# Patient Record
Sex: MALE | Race: ASIAN | NOT HISPANIC OR LATINO | Employment: FULL TIME | URBAN - NONMETROPOLITAN AREA
[De-identification: names, ages, dates, MRNs, and addresses within clinical notes are randomized per-mention and may not be internally consistent; named-entity substitution may affect disease eponyms.]

---

## 2022-12-16 ENCOUNTER — OFFICE VISIT (OUTPATIENT)
Dept: INTERNAL MEDICINE | Facility: CLINIC | Age: 37
End: 2022-12-16

## 2022-12-16 VITALS
RESPIRATION RATE: 16 BRPM | DIASTOLIC BLOOD PRESSURE: 80 MMHG | BODY MASS INDEX: 33.46 KG/M2 | HEIGHT: 72 IN | TEMPERATURE: 97.3 F | HEART RATE: 82 BPM | SYSTOLIC BLOOD PRESSURE: 136 MMHG | WEIGHT: 247 LBS | OXYGEN SATURATION: 99 %

## 2022-12-16 DIAGNOSIS — E66.09 CLASS 1 OBESITY DUE TO EXCESS CALORIES WITHOUT SERIOUS COMORBIDITY WITH BODY MASS INDEX (BMI) OF 33.0 TO 33.9 IN ADULT: ICD-10-CM

## 2022-12-16 DIAGNOSIS — Z00.00 ENCOUNTER FOR PREVENTIVE HEALTH EXAMINATION: Primary | ICD-10-CM

## 2022-12-16 DIAGNOSIS — J68.3 REACTIVE AIRWAYS DYSFUNCTION SYNDROME: ICD-10-CM

## 2022-12-16 DIAGNOSIS — J30.2 SEASONAL ALLERGIES: ICD-10-CM

## 2022-12-16 PROCEDURE — 99385 PREV VISIT NEW AGE 18-39: CPT | Performed by: INTERNAL MEDICINE

## 2022-12-16 RX ORDER — MONTELUKAST SODIUM 10 MG/1
10 TABLET ORAL NIGHTLY
Qty: 30 TABLET | Refills: 2 | Status: SHIPPED | OUTPATIENT
Start: 2022-12-16 | End: 2023-03-14

## 2022-12-16 RX ORDER — ALBUTEROL SULFATE 90 UG/1
2 AEROSOL, METERED RESPIRATORY (INHALATION) EVERY 4 HOURS PRN
Qty: 8 G | Refills: 2 | Status: SHIPPED | OUTPATIENT
Start: 2022-12-16

## 2022-12-16 NOTE — PATIENT INSTRUCTIONS
Health Maintenance, Male  Adopting a healthy lifestyle and getting preventive care are important in promoting health and wellness. Ask your health care provider about:  The right schedule for you to have regular tests and exams.  Things you can do on your own to prevent diseases and keep yourself healthy.  What should I know about diet, weight, and exercise?  Eat a healthy diet    Eat a diet that includes plenty of vegetables, fruits, low-fat dairy products, and lean protein.  Do not eat a lot of foods that are high in solid fats, added sugars, or sodium.  Maintain a healthy weight  Body mass index (BMI) is a measurement that can be used to identify possible weight problems. It estimates body fat based on height and weight. Your health care provider can help determine your BMI and help you achieve or maintain a healthy weight.  Get regular exercise  Get regular exercise. This is one of the most important things you can do for your health. Most adults should:  Exercise for at least 150 minutes each week. The exercise should increase your heart rate and make you sweat (moderate-intensity exercise).  Do strengthening exercises at least twice a week. This is in addition to the moderate-intensity exercise.  Spend less time sitting. Even light physical activity can be beneficial.  Watch cholesterol and blood lipids  Have your blood tested for lipids and cholesterol at 20 years of age, then have this test every 5 years.  You may need to have your cholesterol levels checked more often if:  Your lipid or cholesterol levels are high.  You are older than 40 years of age.  You are at high risk for heart disease.  What should I know about cancer screening?  Many types of cancers can be detected early and may often be prevented. Depending on your health history and family history, you may need to have cancer screening at various ages. This may include screening for:  Colorectal cancer.  Prostate cancer.  Skin cancer.  Lung  cancer.  What should I know about heart disease, diabetes, and high blood pressure?  Blood pressure and heart disease  High blood pressure causes heart disease and increases the risk of stroke. This is more likely to develop in people who have high blood pressure readings or are overweight.  Talk with your health care provider about your target blood pressure readings.  Have your blood pressure checked:  Every 3-5 years if you are 18-39 years of age.  Every year if you are 40 years old or older.  If you are between the ages of 65 and 75 and are a current or former smoker, ask your health care provider if you should have a one-time screening for abdominal aortic aneurysm (AAA).  Diabetes  Have regular diabetes screenings. This checks your fasting blood sugar level. Have the screening done:  Once every three years after age 45 if you are at a normal weight and have a low risk for diabetes.  More often and at a younger age if you are overweight or have a high risk for diabetes.  What should I know about preventing infection?  Hepatitis B  If you have a higher risk for hepatitis B, you should be screened for this virus. Talk with your health care provider to find out if you are at risk for hepatitis B infection.  Hepatitis C  Blood testing is recommended for:  Everyone born from 1945 through 1965.  Anyone with known risk factors for hepatitis C.  Sexually transmitted infections (STIs)  You should be screened each year for STIs, including gonorrhea and chlamydia, if:  You are sexually active and are younger than 24 years of age.  You are older than 24 years of age and your health care provider tells you that you are at risk for this type of infection.  Your sexual activity has changed since you were last screened, and you are at increased risk for chlamydia or gonorrhea. Ask your health care provider if you are at risk.  Ask your health care provider about whether you are at high risk for HIV. Your health care provider  may recommend a prescription medicine to help prevent HIV infection. If you choose to take medicine to prevent HIV, you should first get tested for HIV. You should then be tested every 3 months for as long as you are taking the medicine.  Follow these instructions at home:  Alcohol use  Do not drink alcohol if your health care provider tells you not to drink.  If you drink alcohol:  Limit how much you have to 0-2 drinks a day.  Know how much alcohol is in your drink. In the [place] one drink equals one 12 oz bottle of beer (355 mL), one 5 oz glass of wine (148 mL), or one 1½ oz glass of hard liquor (44 mL).  Lifestyle  Do not use any products that contain nicotine or tobacco. These products include cigarettes, chewing tobacco, and vaping devices, such as e-cigarettes. If you need help quitting, ask your health care provider.  Do not use street drugs.  Do not share needles.  Ask your health care provider for help if you need support or information about quitting drugs.  General instructions  Schedule regular health, dental, and eye exams.  Stay current with your vaccines.  Tell your health care provider if:  You often feel depressed.  You have ever been abused or do not feel safe at home.  Summary  Adopting a healthy lifestyle and getting preventive care are important in promoting health and wellness.  Follow your health care provider's instructions about healthy diet, exercising, and getting tested or screened for diseases.  Follow your health care provider's instructions on monitoring your cholesterol and blood pressure.  This information is not intended to replace advice given to you by your health care provider. Make sure you discuss any questions you have with your health care provider.  Document Revised: 05/09/2022 Document Reviewed: 05/09/2022  Elsevier Patient Education © 2022 [employer]

## 2022-12-16 NOTE — PROGRESS NOTES
Chief Complaint   Patient presents with   • Annual Exam   • Establish Care     In the [employer], may be in [place] a year       Subjective     History of Present Illness   Dg Morse is a 37 y.o. male presenting for annual physical.  Preventive health maintenance was reviewed and discussed today. Vaccines were updated. Family history of hypertension.  Has been dealing with more sinus issues since moving to [place].  Has tried OTC medications such as zyrtec.  Has a slight wheeze occasionally when he takes a deep breath.  Has some soreness and headaches between the eyes and irritation in the ears.  Denies smoking.  Father was recently diagnosed with Parkinson's. Originally from [place].     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  Walks and climbs steps at times.  Exercise routine has not been as regular since moving to [place] and managing family life with kids.  Usually cooks at home.     The following portions of the patient's history were reviewed and updated as appropriate: allergies, current medications, past family history, past medical history, past social history, past surgical history and problem list.    Review of Systems   Constitutional: Negative for chills, fatigue and fever.   HENT: Negative for congestion, ear pain, rhinorrhea, sinus pressure and sore throat.    Eyes: Negative for visual disturbance.   Respiratory: Negative for cough, chest tightness, shortness of breath and wheezing.    Cardiovascular: Negative for chest pain, palpitations and leg swelling.   Gastrointestinal: Negative for abdominal pain, blood in stool, constipation, diarrhea, nausea and vomiting.   Endocrine: Negative for polydipsia and polyuria.   Genitourinary: Negative for dysuria and hematuria.   Musculoskeletal: Negative for arthralgias and back pain.   Skin: Negative for rash.   Neurological: Negative for dizziness, light-headedness, numbness and headaches.   Psychiatric/Behavioral: Negative for dysphoric mood and sleep  "disturbance. The patient is not nervous/anxious.        No Known Allergies    History reviewed. No pertinent past medical history.    Social History     Socioeconomic History   • Marital status:    Tobacco Use   • Smoking status: Never   Substance and Sexual Activity   • Alcohol use: Never   • Drug use: Never   • Sexual activity: Yes     Partners: Female     Birth control/protection: Surgical        History reviewed. No pertinent surgical history.    Family History   Problem Relation Age of Onset   • Hypertension Mother    • Parkinsonism Father          Current Outpatient Medications:   •  albuterol sulfate  (90 Base) MCG/ACT inhaler, Inhale 2 puffs Every 4 (Four) Hours As Needed for Wheezing., Disp: 8 g, Rfl: 2  •  montelukast (SINGULAIR) 10 MG tablet, Take 1 tablet by mouth Every Night., Disp: 30 tablet, Rfl: 2    Objective   /80   Pulse 82   Temp 97.3 °F (36.3 °C)   Resp 16   Ht 182.9 cm (72\")   Wt 112 kg (247 lb)   SpO2 99%   BMI 33.50 kg/m²     Physical Exam  Vitals and nursing note reviewed.   Constitutional:       Appearance: Normal appearance. He is well-developed. He is obese.   HENT:      Head: Normocephalic and atraumatic.      Right Ear: Tympanic membrane and external ear normal.      Left Ear: Tympanic membrane and external ear normal.      Nose: Nose normal. No congestion.      Mouth/Throat:      Mouth: Mucous membranes are moist.      Pharynx: No oropharyngeal exudate.   Eyes:      General: No scleral icterus.        Right eye: No discharge.      Pupils: Pupils are equal, round, and reactive to light.   Neck:      Thyroid: No thyromegaly.      Vascular: No JVD.   Cardiovascular:      Rate and Rhythm: Normal rate and regular rhythm.      Heart sounds: Normal heart sounds. No murmur heard.    No friction rub.   Pulmonary:      Effort: Pulmonary effort is normal. No respiratory distress.      Breath sounds: Normal breath sounds. No stridor. No wheezing.   Abdominal:      " General: Bowel sounds are normal. There is no distension.      Palpations: Abdomen is soft.      Tenderness: There is no abdominal tenderness. There is no guarding.   Genitourinary:     Comments: Deferred  Musculoskeletal:         General: No tenderness. Normal range of motion.      Cervical back: Normal range of motion and neck supple.   Lymphadenopathy:      Cervical: No cervical adenopathy.   Skin:     General: Skin is warm and dry.      Findings: No rash.   Neurological:      Mental Status: He is alert and oriented to person, place, and time.      Cranial Nerves: No cranial nerve deficit.   Psychiatric:         Mood and Affect: Mood normal.         Behavior: Behavior normal.         Thought Content: Thought content normal.         Assessment & Plan   Diagnoses and all orders for this visit:    1. Encounter for preventive health examination (Primary)  -     CBC & Differential  -     Comprehensive Metabolic Panel  -     Lipid Panel  -     Hepatitis Panel, Acute  -     Hemoglobin A1c    2. Class 1 obesity due to excess calories without serious comorbidity with body mass index (BMI) of 33.0 to 33.9 in adult  -     Hemoglobin A1c    3. Seasonal allergies  -     montelukast (SINGULAIR) 10 MG tablet; Take 1 tablet by mouth Every Night.  Dispense: 30 tablet; Refill: 2  -     albuterol sulfate  (90 Base) MCG/ACT inhaler; Inhale 2 puffs Every 4 (Four) Hours As Needed for Wheezing.  Dispense: 8 g; Refill: 2    4. Reactive airways dysfunction syndrome (HCC)  -     albuterol sulfate  (90 Base) MCG/ACT inhaler; Inhale 2 puffs Every 4 (Four) Hours As Needed for Wheezing.  Dispense: 8 g; Refill: 2          Discussion Summary:  Patient is a 37 y.o. male presenting for annual physical    1. Preventive Health Maintenance  - Baseline labs are up-to-date or ordered per above.  - Vaccines reviewed and updated  - Preventive health measures were discussed including: healthy diet with increase in fruits and vegetables,  regular exercise at least 3 times a week, safe sex practices, avoidance of drugs, tobacco, and alcohol, and regular seatbelt use.    2. Seasonal Allergies with mild reactive airways  - seems to be worse since moving to [place]  - ok to try Singulair and albuterol inhaler PRN.   - should symptoms persist, consider PFTs and allergy consult.       3.  Obesity  - Weight loss options were discussed in detail including reducing fried, fatty, and sugary foods with diet control. A regular exercise regimen was discussed.  BMI is >= 30 and <35. (Class 1 Obesity). The following options were offered after discussion;: exercise counseling/recommendations and nutrition counseling/recommendations        Follow up:  Return in about 1 year (around 12/16/2023) for Annual physical.     Patient Instructions:  Patient instructions were provided.

## 2022-12-17 ENCOUNTER — LAB (OUTPATIENT)
Dept: LAB | Facility: HOSPITAL | Age: 37
End: 2022-12-17

## 2022-12-17 LAB
ALBUMIN SERPL-MCNC: 4.5 G/DL (ref 3.5–5.2)
ALBUMIN/GLOB SERPL: 1.7 G/DL
ALP SERPL-CCNC: 35 U/L (ref 39–117)
ALT SERPL W P-5'-P-CCNC: 49 U/L (ref 1–41)
ANION GAP SERPL CALCULATED.3IONS-SCNC: 8 MMOL/L (ref 5–15)
AST SERPL-CCNC: 26 U/L (ref 1–40)
BASOPHILS # BLD AUTO: 0.05 10*3/MM3 (ref 0–0.2)
BASOPHILS NFR BLD AUTO: 0.7 % (ref 0–1.5)
BILIRUB SERPL-MCNC: 0.6 MG/DL (ref 0–1.2)
BUN SERPL-MCNC: 11 MG/DL (ref 6–20)
BUN/CREAT SERPL: 10.9 (ref 7–25)
CALCIUM SPEC-SCNC: 9 MG/DL (ref 8.6–10.5)
CHLORIDE SERPL-SCNC: 101 MMOL/L (ref 98–107)
CHOLEST SERPL-MCNC: 276 MG/DL (ref 0–200)
CO2 SERPL-SCNC: 31 MMOL/L (ref 22–29)
CREAT SERPL-MCNC: 1.01 MG/DL (ref 0.76–1.27)
DEPRECATED RDW RBC AUTO: 40.9 FL (ref 37–54)
EGFRCR SERPLBLD CKD-EPI 2021: 98.2 ML/MIN/1.73
EOSINOPHIL # BLD AUTO: 0.26 10*3/MM3 (ref 0–0.4)
EOSINOPHIL NFR BLD AUTO: 3.5 % (ref 0.3–6.2)
ERYTHROCYTE [DISTWIDTH] IN BLOOD BY AUTOMATED COUNT: 12.5 % (ref 12.3–15.4)
GLOBULIN UR ELPH-MCNC: 2.7 GM/DL
GLUCOSE SERPL-MCNC: 113 MG/DL (ref 65–99)
HAV IGM SERPL QL IA: NORMAL
HBA1C MFR BLD: 6.2 % (ref 4.8–5.6)
HBV CORE IGM SERPL QL IA: NORMAL
HBV SURFACE AG SERPL QL IA: NORMAL
HCT VFR BLD AUTO: 51.1 % (ref 37.5–51)
HCV AB SER DONR QL: NORMAL
HDLC SERPL-MCNC: 33 MG/DL (ref 40–60)
HGB BLD-MCNC: 17.2 G/DL (ref 13–17.7)
IMM GRANULOCYTES # BLD AUTO: 0.04 10*3/MM3 (ref 0–0.05)
IMM GRANULOCYTES NFR BLD AUTO: 0.5 % (ref 0–0.5)
LDLC SERPL CALC-MCNC: 170 MG/DL (ref 0–100)
LDLC/HDLC SERPL: 5.09 {RATIO}
LYMPHOCYTES # BLD AUTO: 3.55 10*3/MM3 (ref 0.7–3.1)
LYMPHOCYTES NFR BLD AUTO: 47.7 % (ref 19.6–45.3)
MCH RBC QN AUTO: 30 PG (ref 26.6–33)
MCHC RBC AUTO-ENTMCNC: 33.7 G/DL (ref 31.5–35.7)
MCV RBC AUTO: 89.2 FL (ref 79–97)
MONOCYTES # BLD AUTO: 0.56 10*3/MM3 (ref 0.1–0.9)
MONOCYTES NFR BLD AUTO: 7.5 % (ref 5–12)
NEUTROPHILS NFR BLD AUTO: 2.99 10*3/MM3 (ref 1.7–7)
NEUTROPHILS NFR BLD AUTO: 40.1 % (ref 42.7–76)
NRBC BLD AUTO-RTO: 0 /100 WBC (ref 0–0.2)
PLATELET # BLD AUTO: 134 10*3/MM3 (ref 140–450)
PMV BLD AUTO: 10.3 FL (ref 6–12)
POTASSIUM SERPL-SCNC: 4 MMOL/L (ref 3.5–5.2)
PROT SERPL-MCNC: 7.2 G/DL (ref 6–8.5)
RBC # BLD AUTO: 5.73 10*6/MM3 (ref 4.14–5.8)
SODIUM SERPL-SCNC: 140 MMOL/L (ref 136–145)
TRIGL SERPL-MCNC: 375 MG/DL (ref 0–150)
VLDLC SERPL-MCNC: 73 MG/DL (ref 5–40)
WBC NRBC COR # BLD: 7.45 10*3/MM3 (ref 3.4–10.8)

## 2022-12-17 PROCEDURE — 36415 COLL VENOUS BLD VENIPUNCTURE: CPT | Performed by: INTERNAL MEDICINE

## 2022-12-17 PROCEDURE — 85025 COMPLETE CBC W/AUTO DIFF WBC: CPT | Performed by: INTERNAL MEDICINE

## 2022-12-17 PROCEDURE — 80061 LIPID PANEL: CPT | Performed by: INTERNAL MEDICINE

## 2022-12-17 PROCEDURE — 80053 COMPREHEN METABOLIC PANEL: CPT | Performed by: INTERNAL MEDICINE

## 2022-12-17 PROCEDURE — 80074 ACUTE HEPATITIS PANEL: CPT | Performed by: INTERNAL MEDICINE

## 2022-12-17 PROCEDURE — 83036 HEMOGLOBIN GLYCOSYLATED A1C: CPT | Performed by: INTERNAL MEDICINE

## 2022-12-17 NOTE — PROGRESS NOTES
I reviewed the patient's labs.  His cholesterol and A1C are high.  A1C indicates borderline diabetes.  I suspect poor diet causing these very high levels.  It is also starting to show mild elevation in liver tests.      Rather than starting medication, I would like for the patient to work on diet aggressively over the next 3 months. We can have follow up and repeat some labs.

## 2023-03-13 DIAGNOSIS — J30.2 SEASONAL ALLERGIES: ICD-10-CM

## 2023-03-14 RX ORDER — MONTELUKAST SODIUM 10 MG/1
10 TABLET ORAL NIGHTLY
Qty: 90 TABLET | Refills: 1 | Status: SHIPPED | OUTPATIENT
Start: 2023-03-14

## 2023-10-23 ENCOUNTER — TELEPHONE (OUTPATIENT)
Dept: INTERNAL MEDICINE | Facility: CLINIC | Age: 38
End: 2023-10-23

## 2023-10-23 NOTE — TELEPHONE ENCOUNTER
Caller: Dg Morse    Relationship: Self    Best call back number: [phone]     What is the medical concern/diagnosis: ALLERGIES    What specialty or service is being requested: ALLERGIST    What is the provider, practice or medical service name: THE DOCTOR HE WAS PREVIOUSLY SENT TO    What is the office location: [place]    Any additional details: PATIENT STATES THAT HIS ALLERGIES HAVE STARTED ACTING UP AGAIN.

## 2023-10-27 ENCOUNTER — OFFICE VISIT (OUTPATIENT)
Dept: INTERNAL MEDICINE | Facility: CLINIC | Age: 38
End: 2023-10-27
Payer: COMMERCIAL

## 2023-10-27 VITALS
TEMPERATURE: 97.5 F | HEIGHT: 72 IN | OXYGEN SATURATION: 97 % | BODY MASS INDEX: 34.19 KG/M2 | HEART RATE: 98 BPM | SYSTOLIC BLOOD PRESSURE: 144 MMHG | WEIGHT: 252.4 LBS | DIASTOLIC BLOOD PRESSURE: 110 MMHG

## 2023-10-27 DIAGNOSIS — J30.2 SEASONAL ALLERGIES: ICD-10-CM

## 2023-10-27 DIAGNOSIS — Z23 NEED FOR INFLUENZA VACCINATION: Primary | ICD-10-CM

## 2023-10-27 RX ORDER — MONTELUKAST SODIUM 10 MG/1
10 TABLET ORAL NIGHTLY
Qty: 90 TABLET | Refills: 1 | Status: SHIPPED | OUTPATIENT
Start: 2023-10-27

## 2023-10-27 NOTE — PROGRESS NOTES
Subjective   Dg Morse is a 38 y.o. male.     History of Present Illness  Patient presents with complaints of seasonal allergies.  He has been using over-the-counter allergy medicines as well as Flonase and a Harmony pot.  States that this is his second year leaving him taking the following last year he had the same symptoms and was started on Singulair.  He states his symptoms got better in the spring so he stopped taking the Singulair.  He has been out of it for a while.  States he is having a lot of nasal congestion and sneezing and ear popping.      The following portions of the patient's history were reviewed and updated as appropriate: allergies, current medications, past family history, past medical history, past social history, past surgical history, and problem list.    Review of Systems   HENT:  Negative for congestion and sore throat.    Respiratory:  Negative for cough.    Neurological:  Negative for headaches.   All other systems reviewed and are negative.      Objective   Physical Exam  Vitals and nursing note reviewed.   Constitutional:       Appearance: Normal appearance. He is normal weight.   HENT:      Head: Normocephalic and atraumatic.      Right Ear: External ear normal.      Left Ear: External ear normal.      Nose: Nose normal.      Mouth/Throat:      Mouth: Mucous membranes are moist.      Pharynx: Oropharynx is clear.   Eyes:      Extraocular Movements: Extraocular movements intact.      Conjunctiva/sclera: Conjunctivae normal.      Pupils: Pupils are equal, round, and reactive to light.   Cardiovascular:      Rate and Rhythm: Normal rate and regular rhythm.      Pulses: Normal pulses.      Heart sounds: Normal heart sounds. No murmur heard.     No gallop.   Pulmonary:      Effort: Pulmonary effort is normal. No respiratory distress.      Breath sounds: Normal breath sounds. No wheezing, rhonchi or rales.   Abdominal:      General: Abdomen is flat. Bowel sounds are normal.       Palpations: Abdomen is soft.   Musculoskeletal:         General: Normal range of motion.      Cervical back: Normal range of motion and neck supple. No rigidity or tenderness.   Lymphadenopathy:      Cervical: No cervical adenopathy.   Skin:     General: Skin is warm.      Capillary Refill: Capillary refill takes less than 2 seconds.      Coloration: Skin is not jaundiced or pale.      Findings: No bruising, erythema, lesion or rash.   Neurological:      General: No focal deficit present.      Mental Status: He is alert and oriented to person, place, and time. Mental status is at baseline.   Psychiatric:         Mood and Affect: Mood normal.         Behavior: Behavior normal.         Thought Content: Thought content normal.         Judgment: Judgment normal.       Assessment & Plan   Diagnoses and all orders for this visit:    1. Need for influenza vaccination (Primary)  -     Fluzone >6 Months ([id number])    2. Seasonal allergies  -     montelukast (SINGULAIR) 10 MG tablet; Take 1 tablet by mouth Every Night.  Dispense: 90 tablet; Refill: 1    Start Singulair on top of over-the-counter's and Flonase to help with seasonal allergies  Flu shot given today  Patient's blood pressure was elevated when he got here but he states that he had a lot of caffeine and food right before he came upstairs, pt left without having bp rechecked

## 2024-03-05 ENCOUNTER — OFFICE VISIT (OUTPATIENT)
Dept: INTERNAL MEDICINE | Facility: CLINIC | Age: 39
End: 2024-03-05
Payer: COMMERCIAL

## 2024-03-05 VITALS
DIASTOLIC BLOOD PRESSURE: 93 MMHG | BODY MASS INDEX: 34.51 KG/M2 | HEIGHT: 72 IN | OXYGEN SATURATION: 98 % | SYSTOLIC BLOOD PRESSURE: 133 MMHG | TEMPERATURE: 97.8 F | WEIGHT: 254.8 LBS | HEART RATE: 68 BPM

## 2024-03-05 DIAGNOSIS — R42 DIZZINESS: ICD-10-CM

## 2024-03-05 DIAGNOSIS — I10 ESSENTIAL HYPERTENSION: ICD-10-CM

## 2024-03-05 DIAGNOSIS — I10 ESSENTIAL HYPERTENSION: Primary | ICD-10-CM

## 2024-03-05 PROBLEM — E78.2 MIXED DYSLIPIDEMIA: Status: ACTIVE | Noted: 2024-03-05

## 2024-03-05 LAB
EXPIRATION DATE: NORMAL
HBA1C MFR BLD: 5.6 % (ref 4.5–5.7)
Lab: NORMAL

## 2024-03-05 RX ORDER — HYDROCHLOROTHIAZIDE 12.5 MG/1
12.5 TABLET ORAL DAILY
Qty: 30 TABLET | Refills: 1 | Status: SHIPPED | OUTPATIENT
Start: 2024-03-05

## 2024-03-05 NOTE — PROGRESS NOTES
Office Visit      Patient Name: Dg Morse  : 1985   MRN: [id number]     Chief Complaint:    Chief Complaint   Patient presents with    Hypertension     /95 at home.     Dizziness     Symptoms 1 week.        History of Present Illness: Dg Morse is a 38 y.o. male who is here today to discuss blood pressure and dizziness.    He reports experiencing dizziness for around 1-1.5 week.  Blood pressure readings at home was 120-125/95. Today he was eating a piece of candy and felt like he was going to pass out, but no LOC. Dizziness comes and goes. Currently has a slight headache but no dizziness. He has recently had a lot of headaches across his forehead bilaterally. Denies ear pain. No chest pain, palpitations, SOA or swelling in legs. He has been stressed recently. No recent illness. No supplement use with exception of a multivitamin. Non-smoker. Prediabetic and high cholesterol on last labs. Has not been exercising consistently for the last 8 months.  He has attempted to improve his diet over the last week but has not noticed any improvement in symptoms yet.  He does have a strong family history of hypertension.    He had labs drawn for pre-deployment physical but he does not have labs back yet. He will provide a copy once available.     Subjective      I have reviewed and the following portions of the patient's history were updated as appropriate: past family history, past medical history, past social history, past surgical history and problem list.      Current Outpatient Medications:     albuterol sulfate  (90 Base) MCG/ACT inhaler, Inhale 2 puffs Every 4 (Four) Hours As Needed for Wheezing. (Patient taking differently: Inhale 2 puffs As Needed for Wheezing.), Disp: 8 g, Rfl: 2    montelukast (SINGULAIR) 10 MG tablet, Take 1 tablet by mouth Every Night., Disp: 90 tablet, Rfl: 1    hydroCHLOROthiazide 12.5 MG tablet, Take 1 tablet by mouth Daily., Disp: 30 tablet, Rfl: 1    No Known  "Allergies    Objective     Physical Exam:  Vital Signs:   Vitals:    03/05/24 1731   BP: 133/93   Pulse: 68   Temp: 97.8 °F (36.6 °C)   SpO2: 98%   Weight: 116 kg (254 lb 12.8 oz)   Height: 182.9 cm (72.01\")     Body mass index is 34.55 kg/m².    Physical Exam  Vitals and nursing note reviewed.   Constitutional:       General: He is not in acute distress.     Appearance: He is well-developed. He is obese. He is not ill-appearing, toxic-appearing or diaphoretic.   HENT:      Head: Normocephalic and atraumatic.      Right Ear: Tympanic membrane, ear canal and external ear normal. There is no impacted cerumen.      Left Ear: Tympanic membrane, ear canal and external ear normal. There is no impacted cerumen.      Mouth/Throat:      Mouth: Mucous membranes are moist.      Pharynx: No posterior oropharyngeal erythema.   Eyes:      General: No scleral icterus.     Extraocular Movements: Extraocular movements intact.      Conjunctiva/sclera: Conjunctivae normal.      Pupils: Pupils are equal, round, and reactive to light.   Cardiovascular:      Rate and Rhythm: Normal rate and regular rhythm.      Heart sounds: Normal heart sounds. No murmur heard.     No friction rub. No gallop.   Pulmonary:      Effort: Pulmonary effort is normal. No respiratory distress.      Breath sounds: Normal breath sounds. No wheezing, rhonchi or rales.   Chest:      Chest wall: No tenderness.   Musculoskeletal:         General: No tenderness or deformity. Normal range of motion.      Cervical back: Normal range of motion and neck supple. No rigidity or tenderness.      Right lower leg: No edema.      Left lower leg: No edema.   Lymphadenopathy:      Cervical: No cervical adenopathy.   Skin:     General: Skin is warm and dry.      Capillary Refill: Capillary refill takes less than 2 seconds.      Coloration: Skin is not jaundiced or pale.      Findings: No erythema or rash.   Neurological:      Mental Status: He is alert and oriented to person, " place, and time.      Cranial Nerves: No cranial nerve deficit.      Sensory: No sensory deficit.      Motor: No weakness or abnormal muscle tone.      Coordination: Coordination normal.      Gait: Gait normal.      Deep Tendon Reflexes: Reflexes normal.   Psychiatric:         Mood and Affect: Mood normal.         Behavior: Behavior normal.         Thought Content: Thought content normal.         Judgment: Judgment normal.         Common labs          3/5/2024    18:02   Common Labs   Hemoglobin A1C 5.6          Assessment / Plan      Assessment/Plan:   Diagnoses and all orders for this visit:    1. Essential hypertension (Primary)  -     hydroCHLOROthiazide 12.5 MG tablet; Take 1 tablet by mouth Daily.  Dispense: 30 tablet; Refill: 1    2. Dizziness  -     POC Glycosylated Hemoglobin (Hb A1C)       A1c 5.6 today.  Hypertension is likely the cause of dizziness.  Begin HCTZ 12.5 mg daily and recheck in 2 weeks or sooner if symptoms worsen.  Recommended low-sodium diet and increased physical activity.    Requested he provide a copy of labs from outside source for review.        Follow Up:   Return in about 2 weeks (around 3/19/2024) for Annual physical.    Patient was given instructions and counseling regarding his condition or for health maintenance advice. Please see specific information pulled into the AVS if appropriate.     Elena Serrato PA-C  [facility]     Please note that portions of this note may have been completed with a voice recognition program. Efforts were made to edit the dictations, but occasionally words are mistranscribed.

## 2024-03-06 ENCOUNTER — TELEPHONE (OUTPATIENT)
Dept: INTERNAL MEDICINE | Facility: CLINIC | Age: 39
End: 2024-03-06
Payer: COMMERCIAL

## 2024-03-06 RX ORDER — HYDROCHLOROTHIAZIDE 12.5 MG/1
12.5 TABLET ORAL DAILY
Qty: 90 TABLET | OUTPATIENT
Start: 2024-03-06

## 2024-03-06 NOTE — TELEPHONE ENCOUNTER
Caller: Dg Morse    Relationship to patient: Self    Best call back number:[phone]     PATIENT IS NEEDING CPT CODES FOR THE FOLLOWING INJECTIONS: TYPHOID - ANTHRAX BOOSTER       PLEASE CALL

## 2024-03-07 NOTE — TELEPHONE ENCOUNTER
Patient said that he is trying to get vaccines, but [facility] them right now and the health dept cannot provide. He said he is [facility] and let us know if he needs any further assistance.

## 2024-03-11 ENCOUNTER — TELEPHONE (OUTPATIENT)
Dept: INTERNAL MEDICINE | Facility: CLINIC | Age: 39
End: 2024-03-11
Payer: COMMERCIAL

## 2024-03-11 NOTE — TELEPHONE ENCOUNTER
Caller: Dg Morse    Relationship to patient: Self    Best call back number: [phone]         PATIENT IS WANTING TO UPDATE HIS VACCINATION RECORD TO SHOW THAT HE REC'D HIS T-DAP SHOT ON 03/06/24 AT THE [facility]. HIS PLACE OF EMPLOYMENT.    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

## 2024-03-19 ENCOUNTER — OFFICE VISIT (OUTPATIENT)
Dept: INTERNAL MEDICINE | Facility: CLINIC | Age: 39
End: 2024-03-19
Payer: COMMERCIAL

## 2024-03-19 VITALS
BODY MASS INDEX: 33.46 KG/M2 | SYSTOLIC BLOOD PRESSURE: 130 MMHG | DIASTOLIC BLOOD PRESSURE: 86 MMHG | HEART RATE: 57 BPM | HEIGHT: 72 IN | TEMPERATURE: 97 F | WEIGHT: 247 LBS | OXYGEN SATURATION: 96 %

## 2024-03-19 DIAGNOSIS — Z00.00 ANNUAL PHYSICAL EXAM: Primary | ICD-10-CM

## 2024-03-19 DIAGNOSIS — I10 ESSENTIAL HYPERTENSION: ICD-10-CM

## 2024-03-19 RX ORDER — HYDROCHLOROTHIAZIDE 25 MG/1
25 TABLET ORAL DAILY
Qty: 90 TABLET | Refills: 2 | Status: SHIPPED | OUTPATIENT
Start: 2024-03-19

## 2024-03-19 NOTE — PROGRESS NOTES
Male Physical Note      Patient Name: Dg Morse  : 1985   MRN: [id number]     Chief Complaint:    Chief Complaint   Patient presents with    Annual Exam    Hypertension       History of Present Illness: Dg Morse is a 38 y.o. male who is here today for their annual health maintenance and physical.     He was seen here 2 weeks ago with complaint of dizziness, increased headaches and elevated blood pressure.  He was started on hydrochlorothiazide 12.5 mg daily which he is taking as directed. Today he reports he is feeling much better now. He has lost 7 pounds through diet and exercise. Dizziness and headaches have resolved. BP has been running 118-125/85-90.     He had labs drawn for pre-deployment physical which we reviewed today. Hemoglobin, hematocrit and RBC elevated. LDL and triglycerides elevated.       Subjective         Past Medical History, Social History, Family History and Care Team were all reviewed with patient and updated as appropriate.       Current Outpatient Medications:     albuterol sulfate  (90 Base) MCG/ACT inhaler, Inhale 2 puffs Every 4 (Four) Hours As Needed for Wheezing. (Patient taking differently: Inhale 2 puffs As Needed for Wheezing.), Disp: 8 g, Rfl: 2    hydroCHLOROthiazide 25 MG tablet, Take 1 tablet by mouth Daily., Disp: 90 tablet, Rfl: 2    montelukast (SINGULAIR) 10 MG tablet, Take 1 tablet by mouth Every Night., Disp: 90 tablet, Rfl: 1    No Known Allergies      Diet/Physical activity: recent diet improvement and exercising more        The ASCVD Risk score (Nico PARISI, et al., 2019) failed to calculate for the following reasons:    The 2019 ASCVD risk score is only valid for ages 40 to 79    Health Maintenance:   Health Maintenance   Topic Date Due    COVID-19 Vaccine ( season) 2024 (Originally 2023)    ANNUAL PHYSICAL  2025    BMI FOLLOWUP  2025    TDAP/TD VACCINES (2 - Td or Tdap) 2034    HEPATITIS C SCREENING   "Completed    INFLUENZA VACCINE  Completed    Pneumococcal Vaccine 0-64  Aged Out            Objective     Physical Exam:  Vital Signs:   Vitals:    03/19/24 1618   BP: 130/86   Pulse: 57   Temp: 97 °F (36.1 °C)   TempSrc: Infrared   SpO2: 96%   Weight: 112 kg (247 lb)   Height: 182.9 cm (72.01\")   PainSc: 0-No pain     Body mass index is 33.49 kg/m².   BMI is >= 30 and <35. (Class 1 Obesity). The following options were offered after discussion;: exercise counseling/recommendations and nutrition counseling/recommendations      Physical Exam  Vitals and nursing note reviewed.   Constitutional:       General: He is not in acute distress.     Appearance: He is well-developed. He is obese. He is not ill-appearing, toxic-appearing or diaphoretic.   HENT:      Head: Normocephalic and atraumatic.      Right Ear: Tympanic membrane, ear canal and external ear normal. There is no impacted cerumen.      Left Ear: Tympanic membrane, ear canal and external ear normal. There is no impacted cerumen.      Nose: Nose normal.      Mouth/Throat:      Mouth: Mucous membranes are moist.      Pharynx: No oropharyngeal exudate or posterior oropharyngeal erythema.   Eyes:      General: No scleral icterus.     Conjunctiva/sclera: Conjunctivae normal.      Pupils: Pupils are equal, round, and reactive to light.   Neck:      Thyroid: No thyromegaly.   Cardiovascular:      Rate and Rhythm: Normal rate and regular rhythm.      Heart sounds: Normal heart sounds. No murmur heard.     No friction rub. No gallop.   Pulmonary:      Effort: Pulmonary effort is normal. No respiratory distress.      Breath sounds: Normal breath sounds. No stridor. No wheezing, rhonchi or rales.   Chest:      Chest wall: No tenderness.   Abdominal:      General: Bowel sounds are normal. There is no distension.      Palpations: Abdomen is soft. There is no mass.      Tenderness: There is no abdominal tenderness. There is no guarding or rebound.      Hernia: No hernia is " present.   Musculoskeletal:         General: No tenderness. Normal range of motion.      Cervical back: Normal range of motion and neck supple.      Right lower leg: No edema.      Left lower leg: No edema.   Lymphadenopathy:      Cervical: No cervical adenopathy.   Skin:     General: Skin is warm and dry.      Capillary Refill: Capillary refill takes less than 2 seconds.      Coloration: Skin is not pale.      Findings: No rash.   Neurological:      Mental Status: He is alert and oriented to person, place, and time.      Cranial Nerves: No cranial nerve deficit.      Sensory: No sensory deficit.      Gait: Gait normal.      Deep Tendon Reflexes: Reflexes normal.   Psychiatric:         Mood and Affect: Mood normal.         Behavior: Behavior normal.         Thought Content: Thought content normal.         Judgment: Judgment normal.         Procedures    Assessment / Plan      Assessment/Plan:   Diagnoses and all orders for this visit:    1. Annual physical exam (Primary)    2. Essential hypertension  -     hydroCHLOROthiazide 25 MG tablet; Take 1 tablet by mouth Daily.  Dispense: 90 tablet; Refill: 2    Increase HCTZ to 25 mg daily.     Recheck HTN and HLD after upcoming 6-month deployment.       Follow Up:   Return follow up after upcoming 6-month deployment.    Healthcare Maintenance:   Patient Counseling:  --Nutrition: Stressed importance of moderation in sodium/caffeine intake, saturated fat and cholesterol, caloric balance, sufficient intake of fresh fruits, vegetables, fiber, calcium and iron.  --Discussed the issue of calcium supplement, and the daily use of baby aspirin if applicable.  --Exercise: Stressed the importance of regular exercise.   --Substance Abuse: Discussed cessation/primary prevention of tobacco (if applicable, alcohol, or other drug use (if applicable); driving or other dangerous activities under the influence; availability of treatment for abuse.    --Sexuality: Discussed sexually  transmitted diseases, partner selection, use of condoms, avoidance of unintended pregnancy  and contraceptive alternatives.   --Injury prevention: Discussed safety belts, safety helmets, smoke detector, smoking near bedding or upholstery.   --Dental health: Discussed importance of regular tooth brushing, flossing, and dental visits.  --Immunizations reviewed.  --Discussed benefits of screening colonoscopy (if applicable).  --After hours service discussed with patient.    Elena Serrato PA-C  [facility] [address] [place]     Please note that portions of this note may have been completed with a voice recognition program.

## 2024-04-01 DIAGNOSIS — I10 ESSENTIAL HYPERTENSION: ICD-10-CM

## 2024-04-01 RX ORDER — HYDROCHLOROTHIAZIDE 12.5 MG/1
12.5 TABLET ORAL DAILY
Qty: 90 TABLET | OUTPATIENT
Start: 2024-04-01

## 2024-04-29 DIAGNOSIS — I10 ESSENTIAL HYPERTENSION: ICD-10-CM

## 2024-04-29 RX ORDER — HYDROCHLOROTHIAZIDE 12.5 MG/1
12.5 TABLET ORAL DAILY
Qty: 90 TABLET | OUTPATIENT
Start: 2024-04-29

## 2024-12-02 ENCOUNTER — OFFICE VISIT (OUTPATIENT)
Dept: PSYCHIATRY | Facility: CLINIC | Age: 39
End: 2024-12-02
Payer: COMMERCIAL

## 2024-12-02 DIAGNOSIS — F43.23 SITUATIONAL MIXED ANXIETY AND DEPRESSIVE DISORDER: Primary | ICD-10-CM

## 2024-12-02 PROCEDURE — 90791 PSYCH DIAGNOSTIC EVALUATION: CPT | Performed by: CASE MANAGER/CARE COORDINATOR
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
## 2024-12-04 ENCOUNTER — HOSPITAL ENCOUNTER (EMERGENCY)
Facility: HOSPITAL | Age: 39
Discharge: PSYCHIATRIC OR SUBSTANCE USE FACILITY | DRG: 882 | End: 2024-12-04
Attending: EMERGENCY MEDICINE | Admitting: EMERGENCY MEDICINE
Payer: COMMERCIAL

## 2024-12-04 ENCOUNTER — HOSPITAL ENCOUNTER (INPATIENT)
Facility: HOSPITAL | Age: 39
LOS: 7 days | Discharge: HOME | DRG: 882 | End: 2024-12-11
Attending: SPECIALIST | Admitting: SPECIALIST
Payer: COMMERCIAL

## 2024-12-04 VITALS
RESPIRATION RATE: 12 BRPM | DIASTOLIC BLOOD PRESSURE: 100 MMHG | SYSTOLIC BLOOD PRESSURE: 152 MMHG | HEIGHT: 72 IN | BODY MASS INDEX: 34.57 KG/M2 | OXYGEN SATURATION: 96 % | TEMPERATURE: 97.5 F | WEIGHT: 255.2 LBS | HEART RATE: 80 BPM

## 2024-12-04 DIAGNOSIS — R45.851 SUICIDAL IDEATION: Primary | ICD-10-CM

## 2024-12-04 PROBLEM — F33.1 MDD (MAJOR DEPRESSIVE DISORDER), RECURRENT EPISODE, MODERATE: Status: ACTIVE | Noted: 2024-12-04

## 2024-12-04 LAB
ALBUMIN SERPL-MCNC: 4.6 G/DL (ref 3.5–5.2)
ALBUMIN/GLOB SERPL: 1.3 G/DL
ALP SERPL-CCNC: 34 U/L (ref 39–117)
ALT SERPL W P-5'-P-CCNC: 57 U/L (ref 1–41)
AMPHET+METHAMPHET UR QL: NEGATIVE
AMPHETAMINES UR QL: NEGATIVE
ANION GAP SERPL CALCULATED.3IONS-SCNC: 13 MMOL/L (ref 5–15)
APAP SERPL-MCNC: <5 MCG/ML (ref 0–30)
AST SERPL-CCNC: 38 U/L (ref 1–40)
BARBITURATES UR QL SCN: NEGATIVE
BASOPHILS # BLD AUTO: 0.05 10*3/MM3 (ref 0–0.2)
BASOPHILS NFR BLD AUTO: 0.4 % (ref 0–1.5)
BENZODIAZ UR QL SCN: NEGATIVE
BILIRUB SERPL-MCNC: 1.2 MG/DL (ref 0–1.2)
BILIRUB UR QL STRIP: NEGATIVE
BUN SERPL-MCNC: 9 MG/DL (ref 6–20)
BUN/CREAT SERPL: 8.4 (ref 7–25)
BUPRENORPHINE SERPL-MCNC: NEGATIVE NG/ML
CALCIUM SPEC-SCNC: 9.4 MG/DL (ref 8.6–10.5)
CANNABINOIDS SERPL QL: NEGATIVE
CHLORIDE SERPL-SCNC: 97 MMOL/L (ref 98–107)
CLARITY UR: CLEAR
CO2 SERPL-SCNC: 25 MMOL/L (ref 22–29)
COCAINE UR QL: NEGATIVE
COLOR UR: YELLOW
CREAT SERPL-MCNC: 1.07 MG/DL (ref 0.76–1.27)
DEPRECATED RDW RBC AUTO: 37.5 FL (ref 37–54)
EGFRCR SERPLBLD CKD-EPI 2021: 90.5 ML/MIN/1.73
EOSINOPHIL # BLD AUTO: 0.02 10*3/MM3 (ref 0–0.4)
EOSINOPHIL NFR BLD AUTO: 0.2 % (ref 0.3–6.2)
ERYTHROCYTE [DISTWIDTH] IN BLOOD BY AUTOMATED COUNT: 12.2 % (ref 12.3–15.4)
ETHANOL BLD-MCNC: <10 MG/DL (ref 0–10)
ETHANOL UR QL: <0.01 %
FENTANYL UR-MCNC: NEGATIVE NG/ML
GLOBULIN UR ELPH-MCNC: 3.6 GM/DL
GLUCOSE SERPL-MCNC: 118 MG/DL (ref 65–99)
GLUCOSE UR STRIP-MCNC: NEGATIVE MG/DL
HCT VFR BLD AUTO: 49.1 % (ref 37.5–51)
HGB BLD-MCNC: 17.4 G/DL (ref 13–17.7)
HGB UR QL STRIP.AUTO: NEGATIVE
HOLD SPECIMEN: NORMAL
HOLD SPECIMEN: NORMAL
IMM GRANULOCYTES # BLD AUTO: 0.06 10*3/MM3 (ref 0–0.05)
IMM GRANULOCYTES NFR BLD AUTO: 0.5 % (ref 0–0.5)
KETONES UR QL STRIP: ABNORMAL
LEUKOCYTE ESTERASE UR QL STRIP.AUTO: NEGATIVE
LYMPHOCYTES # BLD AUTO: 2.79 10*3/MM3 (ref 0.7–3.1)
LYMPHOCYTES NFR BLD AUTO: 24.2 % (ref 19.6–45.3)
MAGNESIUM SERPL-MCNC: 2.3 MG/DL (ref 1.6–2.6)
MCH RBC QN AUTO: 30.3 PG (ref 26.6–33)
MCHC RBC AUTO-ENTMCNC: 35.4 G/DL (ref 31.5–35.7)
MCV RBC AUTO: 85.4 FL (ref 79–97)
METHADONE UR QL SCN: NEGATIVE
MONOCYTES # BLD AUTO: 1.15 10*3/MM3 (ref 0.1–0.9)
MONOCYTES NFR BLD AUTO: 10 % (ref 5–12)
NEUTROPHILS NFR BLD AUTO: 64.7 % (ref 42.7–76)
NEUTROPHILS NFR BLD AUTO: 7.47 10*3/MM3 (ref 1.7–7)
NITRITE UR QL STRIP: NEGATIVE
NRBC BLD AUTO-RTO: 0 /100 WBC (ref 0–0.2)
OPIATES UR QL: NEGATIVE
OXYCODONE UR QL SCN: NEGATIVE
PCP UR QL SCN: NEGATIVE
PH UR STRIP.AUTO: 6.5 [PH] (ref 5–8)
PLATELET # BLD AUTO: 263 10*3/MM3 (ref 140–450)
PMV BLD AUTO: 9.7 FL (ref 6–12)
POTASSIUM SERPL-SCNC: 3.7 MMOL/L (ref 3.5–5.2)
PROT SERPL-MCNC: 8.2 G/DL (ref 6–8.5)
PROT UR QL STRIP: NEGATIVE
RBC # BLD AUTO: 5.75 10*6/MM3 (ref 4.14–5.8)
SALICYLATES SERPL-MCNC: <0.3 MG/DL
SODIUM SERPL-SCNC: 135 MMOL/L (ref 136–145)
SP GR UR STRIP: 1.01 (ref 1–1.03)
TRICYCLICS UR QL SCN: NEGATIVE
TSH SERPL DL<=0.05 MIU/L-ACNC: 4.53 UIU/ML (ref 0.27–4.2)
UROBILINOGEN UR QL STRIP: ABNORMAL
WBC NRBC COR # BLD AUTO: 11.54 10*3/MM3 (ref 3.4–10.8)
WHOLE BLOOD HOLD COAG: NORMAL
WHOLE BLOOD HOLD SPECIMEN: NORMAL

## 2024-12-04 PROCEDURE — 99223 1ST HOSP IP/OBS HIGH 75: CPT | Performed by: PSYCHIATRY & NEUROLOGY

## 2024-12-04 PROCEDURE — 99285 EMERGENCY DEPT VISIT HI MDM: CPT

## 2024-12-04 PROCEDURE — 36415 COLL VENOUS BLD VENIPUNCTURE: CPT

## 2024-12-04 PROCEDURE — 99285 EMERGENCY DEPT VISIT HI MDM: CPT | Performed by: EMERGENCY MEDICINE

## 2024-12-04 PROCEDURE — 80143 DRUG ASSAY ACETAMINOPHEN: CPT | Performed by: EMERGENCY MEDICINE

## 2024-12-04 PROCEDURE — 80307 DRUG TEST PRSMV CHEM ANLYZR: CPT | Performed by: EMERGENCY MEDICINE

## 2024-12-04 PROCEDURE — 83735 ASSAY OF MAGNESIUM: CPT | Performed by: EMERGENCY MEDICINE

## 2024-12-04 PROCEDURE — 93005 ELECTROCARDIOGRAM TRACING: CPT | Performed by: EMERGENCY MEDICINE

## 2024-12-04 PROCEDURE — 82077 ASSAY SPEC XCP UR&BREATH IA: CPT | Performed by: EMERGENCY MEDICINE

## 2024-12-04 PROCEDURE — 80050 GENERAL HEALTH PANEL: CPT | Performed by: EMERGENCY MEDICINE

## 2024-12-04 PROCEDURE — 84439 ASSAY OF FREE THYROXINE: CPT | Performed by: NURSE PRACTITIONER

## 2024-12-04 PROCEDURE — 80179 DRUG ASSAY SALICYLATE: CPT | Performed by: EMERGENCY MEDICINE

## 2024-12-04 PROCEDURE — 81003 URINALYSIS AUTO W/O SCOPE: CPT | Performed by: EMERGENCY MEDICINE

## 2024-12-04 RX ORDER — HYDROCHLOROTHIAZIDE 12.5 MG/1
25 TABLET ORAL DAILY
Status: DISCONTINUED | OUTPATIENT
Start: 2024-12-04 | End: 2024-12-05

## 2024-12-04 RX ORDER — LOPERAMIDE HYDROCHLORIDE 2 MG/1
2 CAPSULE ORAL
Status: DISCONTINUED | OUTPATIENT
Start: 2024-12-04 | End: 2024-12-11 | Stop reason: HOSPADM

## 2024-12-04 RX ORDER — ACETAMINOPHEN 325 MG/1
650 TABLET ORAL EVERY 4 HOURS PRN
Status: DISCONTINUED | OUTPATIENT
Start: 2024-12-04 | End: 2024-12-11 | Stop reason: HOSPADM

## 2024-12-04 RX ORDER — POLYETHYLENE GLYCOL 3350 17 G/17G
17 POWDER, FOR SOLUTION ORAL DAILY PRN
Status: DISCONTINUED | OUTPATIENT
Start: 2024-12-04 | End: 2024-12-11 | Stop reason: HOSPADM

## 2024-12-04 RX ORDER — HYDROXYZINE HYDROCHLORIDE 25 MG/1
50 TABLET, FILM COATED ORAL EVERY 6 HOURS PRN
Status: DISCONTINUED | OUTPATIENT
Start: 2024-12-04 | End: 2024-12-08

## 2024-12-04 RX ORDER — ALUMINA, MAGNESIA, AND SIMETHICONE 2400; 2400; 240 MG/30ML; MG/30ML; MG/30ML
15 SUSPENSION ORAL EVERY 6 HOURS PRN
Status: DISCONTINUED | OUTPATIENT
Start: 2024-12-04 | End: 2024-12-11 | Stop reason: HOSPADM

## 2024-12-04 RX ORDER — TRAZODONE HYDROCHLORIDE 50 MG/1
50 TABLET, FILM COATED ORAL NIGHTLY PRN
Status: DISCONTINUED | OUTPATIENT
Start: 2024-12-04 | End: 2024-12-05

## 2024-12-04 RX ADMIN — FLUOXETINE HYDROCHLORIDE 20 MG: 20 CAPSULE ORAL at 11:25

## 2024-12-04 RX ADMIN — Medication 10 MG: at 20:27

## 2024-12-04 RX ADMIN — HYDROXYZINE HYDROCHLORIDE 50 MG: 25 TABLET ORAL at 05:00

## 2024-12-04 RX ADMIN — HYDROXYZINE HYDROCHLORIDE 50 MG: 25 TABLET ORAL at 20:26

## 2024-12-04 RX ADMIN — TRAZODONE HYDROCHLORIDE 50 MG: 50 TABLET ORAL at 20:27

## 2024-12-04 RX ADMIN — HYDROCHLOROTHIAZIDE 25 MG: 12.5 CAPSULE ORAL at 17:00
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
## 2024-12-04 NOTE — PROGRESS NOTES
"Dietitian Assessment    Patient Name: Dg Morse  YOB: 1985  MRN: [id number]  Admission date: 12/4/2024    Comment:        Clinical Nutrition Assessment      Reason for Assessment MST=2   H&P  Past Medical History:   Diagnosis Date    Depression     Hypertension     Seasonal allergies        History reviewed. No pertinent surgical history.         Current Problems        Encounter Information        Trending Narrative     12/4: Pt is in [facility]. EMR shows 7# (2.7%) within 9 months. Wt loss is not significant to malnutrition time frame criteria at this time. Noted for poor appetite. RD will f/up once PO intake is established and determine need for ONS.      Anthropometrics        Current Height, Weight Height: 183 cm (72.05\")  Weight: 112 kg (247 lb) (12/04/24 0445)   Trending Weight Hx     This admission:              PTA:     Wt Readings from Last 30 Encounters:   12/04/24 0445 112 kg (247 lb)   12/04/24 0159 116 kg (255 lb 3.2 oz)   03/19/24 1618 112 kg (247 lb)   03/05/24 1731 116 kg (254 lb 12.8 oz)   10/27/23 1522 114 kg (252 lb 6.4 oz)   12/16/22 1041 112 kg (247 lb)      BMI kg/m2 Body mass index is 33.46 kg/m².     Labs        Pertinent Labs     Results from last 7 days   Lab Units 12/04/24  0219   SODIUM mmol/L 135*   POTASSIUM mmol/L 3.7   CHLORIDE mmol/L 97*   CO2 mmol/L 25.0   BUN mg/dL 9   CREATININE mg/dL 1.07   CALCIUM mg/dL 9.4   BILIRUBIN mg/dL 1.2   ALK PHOS U/L 34*   ALT (SGPT) U/L 57*   AST (SGOT) U/L 38   GLUCOSE mg/dL 118*       Results from last 7 days   Lab Units 12/04/24  0219   MAGNESIUM mg/dL 2.3   HEMOGLOBIN g/dL 17.4   HEMATOCRIT % 49.1       Lab Results   Component Value Date    HGBA1C 5.6 03/05/2024            Medications       Scheduled Medications      Infusions       PRN Medications   acetaminophen    aluminum-magnesium hydroxide-simethicone    hydrOXYzine    loperamide    magnesium hydroxide    polyethylene glycol    traZODone     Physical Findings        Trending " Physical   Appearance, NFPE    --  Edema  None noted     Bowel Function LBM: PTA     Tubes N/a     Chewing/Swallowing WNL     Skin WNL       Estimated/Assessed Needs       Energy Requirements    EST Needs, Method, Wt used 9622-6816 kcal (MSJ 1.2-1.3)       Protein Requirements    EST Needs, Method, Wt used  g pro (.8-1 g pro/kg CBW)       Fluid Requirements     Estimated Needs (mL/day) 2581-7357 mL        Current Nutrition Orders & Evaluation of Intake       Oral Nutrition     Food Allergies NKFA   Current PO Diet Diet: Regular/House; Safe Tray; Fluid Consistency: Thin (IDDSI 0)   Supplement    PO Evaluation     Trending % PO Intake 12/4: None recorded at this time     Enteral Nutrition    Enteral Route    Order, Modulars, Flushes    Residual/Tolerance    TF Observation         Parenteral Nutrition     TPN Route    Total # Days on TPN    TPN Order, Lipid Details    MVI & Trace Element Freq    TPN Observation       Nutrition Diagnosis         Nutrition Dx Problem 1 No nutrition dx      Nutrition Dx Problem 2        Intervention Goal         Intervention Goal(s) Maintain CBW  PO intake meet >50% of estimated needs  Liberalized diet     Nutrition Intervention        RD Action Honor food preferences     Nutrition Prescription          Diet Prescription Regular   Supplement Prescription      Enteral Prescription        TPN Prescription      Monitor/Evaluation        Monitor Per protocol, I&O, PO intake, Pertinent labs, Weight, Skin status, GI status, Symptoms, POC/GOC, Swallow function     RD to f/up    Electronically signed by:  Gema Reid RD  12/04/24 08:50 EST
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
## 2024-12-04 NOTE — CONSULTS
Dg Morse  1985    Preferred Pronouns: He/Him  Race/Ethnicity:   Martial Status:   Guardian Name/Contact/etc: Self  Pt Lives With:  Wife and children ages 12 and 17  Occupation: Retired [employer]  Appearance:  Patient dressed appropriately     Time Called for Assessment: 02:58  Assessment Start and End: 02:58-03:12      DATA:   Clinician received a call from [facility] staff for a behavioral health consult.  The patient is agreeable to speak with the behavioral health team.  Met with patient at bedside. Patient is under 1:1 security monitoring.  The attending treatment team is Luis Calix RN and Dr. Valdez, Provider.  Patient presents today with chief compliant of suicidal ideation.   Clinician completed assessment with patient and observations are documented as follows.    ASSESSMENT:    Clinician consulted with patient for mental status exam and assessment.  Clinical descriptors are documented as follows.  Clinician completed CSSRS with patient for suicide risk assessment.  The results of patient’s CSSRS documented as follows.    Presenting Problems: Patient reported that he had been having an affair on his wife and the affair was exposed and posted on social media. Patient reported that he has feelings of shame and guilt due to the affair. Patient reported he has not slept but only 4 hours since Saturday. Patient reported decrease in appetite. Patient is tearful and withdrawn. Patient endorsed a death wish and tonight while his wife was asleep he took out his 9mm  and held it to feel the weight of it and plan to use it. Patient reported that the weapon was loaded. Patient denies holding it to his temple. Triage notes reported that he held gun to his temple. Patient reported that he did not shoot himself due to thinking of his children, his wife and his recently  grandmother.     Current Stressors: Relationship stress     Established Therapy, Medication Management or Other  Mental Health Services: Patient reported that he initiated seeking help by coming to [facility] on 12/2 and was seen by therapist. Patient reports that he has weekly therapy appointments started with next appointment on 12/11/2024  Current Psychiatric Medications:   Patient is not on any psychiatric medications at this time      Mental Status Exam:  Behavior: Appropriate  Psychomotor Movement: Appropriate  Attention and Cooperation: Normal and Cooperative  Mood: defeated, depressed and Affect: Appropriate  Orientation: alert and oriented to person, place, and time   Thought Process: linear, logical, and goal directed  Thought Content: normal  Delusions:  None presented  Hallucinations: None and Not demonstrated today   Concentration: Normal  Suicidal Ideation: Present, With plan, and With intent  Homicidal Ideation: Absent  Hopelessness: Severe  Speech: Normal  Eye Contact: Closed  Insight: Good  Judgement: Poor    Sleep: patient reports only having approximately 4 hours sleep since Saturday   Appetite: loss of appetite       Hx of Psychiatric or Detox Hospitalizations:  No  Most recent inpatient admission: N/A    Suicidal Ideation Assessment:    COLUMBIA-SUICIDE SEVERITY RATING SCALE  Psychiatric Inpatient Setting - Discharge Screener    Ask questions that are bold and underlined Discharge   Ask Questions 1 and 2 YES NO   Wish to be Dead:   Person endorses thoughts about a wish to be dead or not alive anymore, or wish to fall asleep and not wake up.  While you were here in the hospital, have you wished you were dead or wished you could go to sleep and not wake up? X    Suicidal Thoughts:   General non-specific thoughts of wanting to end one's life/die by suicide, “I've thought about killing myself” without general thoughts of ways to kill oneself/associated methods, intent, or plan.   While you were here in the hospital, have you actually had thoughts about killing yourself?  X    If YES to 2, ask questions 3, 4, 5, and  6.  If NO to 2, go directly to question 6   3) Suicidal Thoughts with Method (without Specific Plan or Intent to Act):   Person endorses thoughts of suicide and has thought of a least one method during the assessment period. This is different than a specific plan with time, place or method details worked out. “I thought about taking an overdose but I never made a specific plan as to when where or how I would actually do it….and I would never go through with it.”   Have you been thinking about how you might kill yourself?  X    4) Suicidal Intent (without Specific Plan):   Active suicidal thoughts of killing oneself and patient reports having some intent to act on such thoughts, as opposed to “I have the thoughts but I definitely will not do anything about them.”   Have you had these thoughts and had some intention of acting on them or do you have some intention of acting on them after you leave the hospital?  X    5) Suicide Intent with Specific Plan:   Thoughts of killing oneself with details of plan fully or partially worked out and person has some intent to carry it out.   Have you started to work out or worked out the details of how to kill yourself either for while you were here in the hospital or for after you leave the hospital? Do you intend to carry out this plan?   X     6) Suicide Behavior    While you were here in the hospital, have you done anything, started to do anything, or prepared to do anything to end your life?    Examples: Took pills, cut yourself, tried to hang yourself, took out pills but didn't swallow any because you changed your mind or someone took them from you, collected pills, secured a means of obtaining a gun, gave away valuables, wrote a will or suicide note, etc.  X     Suicidal: Present, With plan, and With intent  Patient endorsed a death wish and tonight while his wife was asleep he took out his 9mm  and held it to feel the weight of it and plan to use it. Patient reported that  the weapon was loaded. Patient denies holding it to his temple. Triage notes reported that he held gun to his temple. Patient reported that he did not shoot himself due to thinking of his children, his wife and his recently  grandmother.(If present, describe frequency of thoughts, patient's perception of impulse control, plan or behavior details, etc)  Previous Attempts: None reported   Most Recent Attempt: Tonight      Substance Use History  Patient denies any abuse. UDS negative for substances, EtOH WNL    PLAN:  At this time, clinician recommends inpatient treatment based upon the above assessment.   Clinician collaborated with the treatment team who agree to adopt the recommendations.  Clinician discussed recommendations with patient and/or patient support systems, and patient is agreeable to the plan.  Patient is agreeable for referrals to be sent to facilities and agencies for treatment.    Have the levels of care been discussed with the patient? Yes  Level of care recommendation: inpatient  Is patient agreeable to treatment? Yes      Care Coordination Timeline:  03:26 Patient was presented to Dr. Deleon treatment team. Patient was accepted for admission  03:31 Therapist updated patient on admission. Patient was tearful and willing to go to unit  03:42 ED Treatment team was updated on admission and therapist facilitated RN to RN reporting.    SIGNATURE  Jeremías Da Silva [facility]  2024

## 2024-12-04 NOTE — PLAN OF CARE
Goal Outcome Evaluation:  Plan of Care Reviewed With: patient  Patient Agreement with Plan of Care: agrees        Outcome Evaluation: Patient denies A/VH,SI,HI, no negative behaviors this shift. Will continue to monitor.

## 2024-12-04 NOTE — H&P
INITIAL PSYCHIATRIC HISTORY & PHYSICAL    Patient Identification:  Name:  Dg Morse  Age:  39 y.o.  Sex:  male  :  1985  MRN:  [id number]   Visit Number:  [id number]  Primary Care Physician:  Tonny Oliveros DO    This provider is located at her home address on file with [facility]. This visit is being done on behalf of [facility] using a secure platform for a video visit in a secure and private environment. The Patient is located at [facility]-on a locked Behavioral Health unit. The patient's condition being diagnosed/treated is appropriate for telemedicine. The provider identified herself as well as her credentials. The patient, and/or patient's guardian, consent to being seen remotely, and when consent is given they understand that the consent allows for patient identifiable information to be sent to a third party as needed. They may refuse to be seen remotely at any time. The electronic data is encrypted and password protected, and the patient and/or guardian has been advised of the potential risks to privacy not withstanding such measures.      Admission Legal Status: Voluntary    CC/Focus of Exam: Depression, Suicidal ideation with near attempt to shoot self.      HPI: Dg Morse is a 39 y.o. male who was admitted to [facility] on 2024 after presenting to the emergency room with complaints of suicidal ideation.  He reported having a loaded gun out and prior to using it thought of his children, which kept him from firing the time.    Patient medically cleared in ER prior to being accepted to [facility] for psychiatric care. EKG reviewed and signed off on by ER provider.  Admission labs found to be essentially unremarkable with the exception of the following:    On Psychiatric Evaluation interview today patient reports that he has had a long-term relationship with the mother of his children and despite having been together near  20 years and they have been  for the last 4 years.  He reports that he has also been in a long-term relationship of the other woman for the past 2-1/2 years, and after attempts to end that second relationship, the woman became upset and started posting about their affair on line with pictures.  This has been quite stressful to the patient who is now concerned about returning to work as folks from work are starting to find out about his affair as well.  Patient reports that he had a gun out and thought about using it, but did not move forward with this plan out of concern for his children.  Patient reports feeling quite depressed and anxious and states that this relationship situation is this trigger for him as he typically is a happy-go-sayra type person.    Available medical/psychiatric records reviewed and incorporated into the current document.     PAST PSYCHIATRIC HX:  Psych diagnosis: He denies history of any diagnosis that he knows of.  Inpatient: 12 years ago.   Patient reports 1 previous hospitalization in [place], involving a similar situation and that his side- girlfriend broke up with him.   Outpatient psych: None  Therapy: None  Trauma: Patient reports history of childhood trauma he is a and does not relate details but does ask if he could get some therapy for this as he has not previously discussed this.  Past suicide attempts: 1 previous suicide attempt and reports that he drank chemicals but does not recall what time they were.  Past psychiatric medications: He denies    SUBSTANCE USE HX:  He denies all substance abuse but does say he occasionally drinks tea but has caffeine.    SOCIAL HX:  Grew up: Grew up in [place], has lived in this area for the last 2-1/2 years he reports that he and his now wife and their two children live here as well.  He reports that his parents and aunts and uncles all live in the [place] and then he has a large family.  : He has been  for 4  years to a  woman he has been with about 20 years.   Children: 2 kids  17 and 4 years old.   Living with: family  Highest level of education: Some college and vocational school  Working:  Yes.  Legal history: None    Past Medical History:   Diagnosis Date    Depression     Hypertension     Seasonal allergies         History reviewed. No pertinent surgical history.    Family History   Problem Relation Age of Onset    Hypertension Mother     Parkinsonism Father          Medications Prior to Admission   Medication Sig Dispense Refill Last Dose/Taking    albuterol sulfate  (90 Base) MCG/ACT inhaler Inhale 2 puffs Every 4 (Four) Hours As Needed for Wheezing. (Patient taking differently: Inhale 2 puffs As Needed for Wheezing.) 8 g 2 Past Month    hydroCHLOROthiazide 25 MG tablet Take 1 tablet by mouth Daily. 90 tablet 2 Past Month Morning    montelukast (SINGULAIR) 10 MG tablet Take 1 tablet by mouth Every Night. 90 tablet 1 Past Month Bedtime         ALLERGIES:  Patient has no known allergies.    Temp:  [97.5 °F (36.4 °C)-98.4 °F (36.9 °C)] 98.4 °F (36.9 °C)  Heart Rate:  [74-80] 75  Resp:  [12-18] 16  BP: (135-169)/() 135/100    REVIEW OF SYSTEMS:   Review of Systems   General ROS: negative for - fever or malaise  Respiratory ROS: no cough, shortness of breath noted  Cardiovascular ROS: no chest pain reported  Gastrointestinal ROS: no abdominal pain, nausea, vomiting or diarrhea  Neuro: negative for seizures  Musculoskeletal: No difficulty moving extremities  Skin: negative for rash      PHYSICAL EXAM:   Physical Exam  Constitutional:       Appearance: Normal appearance.   HENT:      Head: Normocephalic.   Eyes:      Extraocular Movements: Extraocular movements intact.   Pulmonary:      Effort: Pulmonary effort is normal.   Musculoskeletal:         General: Normal range of motion.      Cervical back: Normal range of motion.   Neurological:      General: No focal deficit present.      Mental Status: He is alert and  oriented to person, place, and time.         This physical exam has been performed remotely in the unit aided of audio/visual communication tools. Nursing staff present and assisted as needed to complete.       Cranial Nerves I-XII  CN I:               Sense of smell grossly intact  CN II:               Pupils are equal and round. No gross deficits in visual acuity.  CN III IV VI:     Extraocular movements are intact.  CN V:              Facial sensation and strength of muscles of mastication grossly intact.  CN VII:            Muscles of facial expression reveal no asymmetry. Intact.   CN VIII:           Hearing is intact. Whispered voice intact.   CN IX and X:  Palate elevates symmetrically. Intact  CN XI:             Shoulder shrug is intact.   CN XII:            Tongue is midline without evidence of atrophy or fasciculation.     MENTAL STATUS EXAM:    Appearance: Casually dressed, good hygeine.   Behavior: Cooperative, good eye contact  Psychomotor: No psychomotor agitation noted, No EPS, No motor tics noted  Speech: normal rate, rhythm and tone  Mood: Depressed  Affect: congruent and appropriate to topic at hand  Thought Content: no delusional material present  Thought process: goal directed and generally organized.  Suicidality: See HPI  Homicidality: No HI  Perception: No AH/VH  Insight: limited  Judgment: limited      Imaging Results (Last 24 Hours)       ** No results found for the last 24 hours. **             ECG/EMG Results (most recent)       None             Lab Results   Component Value Date    GLUCOSE 118 (H) 12/04/2024    BUN 9 12/04/2024    CREATININE 1.07 12/04/2024    BCR 8.4 12/04/2024    CO2 25.0 12/04/2024    CALCIUM 9.4 12/04/2024    ALBUMIN 4.6 12/04/2024    AST 38 12/04/2024    ALT 57 (H) 12/04/2024       Lab Results   Component Value Date    WBC 11.54 (H) 12/04/2024    HGB 17.4 12/04/2024    HCT 49.1 12/04/2024    MCV 85.4 12/04/2024     12/04/2024       Last Urine Toxicity           Latest Ref Rng & Units 12/4/2024   LAST URINE TOXICITY RESULTS   Amphetamine, Urine Qual Negative Negative    Barbiturates Screen, Urine Negative Negative    Benzodiazepine Screen, Urine Negative Negative    Buprenorphine, Screen, Urine Negative Negative    Cocaine Screen, Urine Negative Negative    Fentanyl, Urine Negative Negative    Methadone Screen , Urine Negative Negative    Methamphetamine, Ur Negative Negative       Details                   Brief Urine Lab Results  (Last result in the past 365 days)        Color   Clarity   Blood   Leuk Est   Nitrite   Protein   CREAT   Urine HCG        12/04/24 0234 Yellow   Clear   Negative   Negative   Negative   Negative                       ASSESSMENT & PLAN:        Suicidal ideation    MDD (major depressive disorder), recurrent episode, moderate      Summary:   39 year old male with suicidal ideations in the context of relationship stressors. Patient endorses intense feelings of depression as well as Anxiety. He is hopeful to start medications.        -Admit to [facility] for safety and stabilization   -Acclimate to unit and daily schedule   -Patient to attend group therapies as well as participate in individual therapy sessions throughout time on the unit.  -Continue precautions and safety checks  -Order comfort PRN medications.  -Education on risks of smoking tobacco products to be complete during stay, and nicotine replacement options made available.  -Physical examinations completed and admission labs reviewed  -A treatment plan will be developed and agreed upon by the patient and treatment team.  -Treatment team to discuss case regularly and start discharge planning early on, to aid in safe transition to a lower level of care when most appropriate for patient. With estimated length of stay 5 to 7 days.  -Medication reconciliation to be completed by Pharmacist, Nurse and Psychiatrist. KELLY to be reviewed if indicated and risk versus benefits as well as  alternatives to medication regimens discussed with the patient. Will monitor for side effects during inpatient stay.     -Medications:   1) Start trial of Prozac 20mgs      -Other: TSH was elevated. Will discuss with him tomorrow .    Psychiatrist:  Sara Morton MD  12/04/24  13:11 EST

## 2024-12-04 NOTE — PLAN OF CARE
Problem: Adult Behavioral Health Plan of Care  Goal: Plan of Care Review  Outcome: Progressing  Flowsheets (Taken 12/4/2024 1401)  Progress: no change  Patient Agreement with Plan of Care: agrees  Outcome Evaluation: New admit. Completed social history and integrated summary.  Plan of Care Reviewed With: patient  Goal: Patient-Specific Goal (Individualization)  Outcome: Progressing  Flowsheets  Taken 12/4/2024 1401  Patient/Family-Specific Goals (Include Timeframe): Patient will deny SI/HI prior to discharge. Patient will identify 1 healthy coping skill and consent to appropriate aftercare plan prior to discharge.  Individualized Care Needs: Therapist to offer 1-4 therapy sessions, aftercare, planning, safety planning, family education, group therapy, and brief CBT/MI interventions.  Taken 12/4/2024 1313  Patient Personal Strengths:   expressive of needs   independent living skills   interests/hobbies   resilient   stable living environment   tolerant   resourceful   positive educational history   appropriate judgment/decision-making  Patient Vulnerabilities:   substance abuse/addiction   family/relationship conflict   lacks insight into illness  Goal: Optimized Coping Skills in Response to Life Stressors  Outcome: Progressing  Intervention: Promote Effective Coping Strategies  Flowsheets (Taken 12/4/2024 1401)  Supportive Measures:   active listening utilized   counseling provided   goal-setting facilitated   decision-making supported   self-care encouraged   verbalization of feelings encouraged   self-responsibility promoted   self-reflection promoted   positive reinforcement provided   problem-solving facilitated  Goal: Develops/Participates in Therapeutic [place] to Support Successful Transition  Outcome: Progressing  Intervention: Foster Therapeutic [place]  Flowsheets (Taken 12/4/2024 1401)  Trust Relationship/Rapport:   care explained   reassurance provided   thoughts/feelings acknowledged   choices  provided   emotional support provided   empathic listening provided   questions answered   questions encouraged  Intervention: Mutually Develop Transition Plan  Flowsheets  Taken 12/4/2024 1401  Outpatient/Agency/Support Group Needs:   outpatient medication management   outpatient counseling  Transition Support: follow-up care discussed  Anticipated Discharge Disposition: home with family  Taken 12/4/2024 1400  Transportation Anticipated: family or friend will provide  Transportation Concerns: none  Current Discharge Risk: psychiatric illness  Concerns to be Addressed: suicidal  Readmission Within the Last 30 Days: no previous admission in last 30 days  Patient/Family Anticipated Services at Transition: mental health services  Patient/Family Anticipates Transition to: home with family   Goal Outcome Evaluation:  Plan of Care Reviewed With: patient  Patient Agreement with Plan of Care: agrees     Progress: no change  Outcome Evaluation: New admit. Completed social history and integrated summary.

## 2024-12-04 NOTE — SIGNIFICANT NOTE
12/04/24 1147   Group Therapy Session   Group Attendance attended group session   Time Session Began 1000   Time Session Ended 1100   Total Time (minutes) 60   Group Type psychotherapeutic;psychoeducation   Group Topic Covered cognitive therapy techniques;cognitive activities   Literature/Videos Given topic handouts   Literature/Videos Given Comments Cognitive Distortions   Group Session Detail Patients participated in psychoeducation worksheet and discussion educating patients on how to recognize cognitive distortions and their impact on mood. By minimizing or avoiding these irrational thought patterns, clients can maintain a more balanced perspective, lower anxiety, and feel better about themselves.   Patient Participation/Contribution cooperative with task;discussed personal experience with topic;expressed understanding of topic;listened actively;organized   Affect During Group affect consistent with mood   Degree of Insight moderate

## 2024-12-04 NOTE — CASE MANAGEMENT/SOCIAL WORK
MATT supervised call between PT and employer to request FMLA paperwork. FMLA was sent to MATT email. MATT and ARNAUD Lewis, to complete forms and return to PT.    Electronically Signed By:  Bella Langston MSW    Date/Time: 12/4/4 15:54

## 2024-12-04 NOTE — SIGNIFICANT NOTE
12/04/24 1316   Group Therapy Session   Group Attendance attended group session   Time Session Began 1130   Time Session Ended 1200   Total Time (minutes) 30   Group Type recreation   Group Topic Covered coping skills/lifestyle management   Group Session Detail Patient participated in coping skills jeopardy game to increase working knowledge of various coping skills and their benefits.   Patient Participation/Contribution able to recall/repeat info presented;cooperative with task;discussed personal experience with topic;expressed understanding of topic;listened actively;offered helpful suggestions to peers;organized   Affect During Group flat;appropriate to situation   Degree of Insight high
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
## 2024-12-04 NOTE — SIGNIFICANT NOTE
12/04/24 1009   Group Therapy Session   Group Attendance attended group session   Time Session Began 0900   Time Session Ended 0930   Total Time (minutes) 30   Group Type recreation   Group Topic Covered goal setting   Group Session Detail Patient participated in activity and discussion about how to progress towards goals.   Patient Participation/Contribution able to recall/repeat info presented;cooperative with task;discussed personal experience with topic;expressed understanding of topic;listened actively;offered helpful suggestions to peers;organized   Affect During Group flat;appropriate to situation   Degree of Insight high

## 2024-12-04 NOTE — ED PROVIDER NOTES
EMERGENCY DEPARTMENT ENCOUNTER    Pt Name: Dg Morse  MRN: [id number]  Pt :   1985  Room Number:    Date of encounter:  2024  PCP: Tonny Oliveros DO  ED Provider: Skip Valdez MD    Historian: Patient    HPI:  Chief Complaint: Suicidal ideation        Context: Dg Morse is a 39 y.o. male who presents to the ED c/o suicidal ideation.  Patient says for the past couple days he has had suicidal thoughts.  He says tonight he took a handgun and held it to his head.  He denies homicidal lesion.  He denies any other complaints.      PAST MEDICAL HISTORY  Past Medical History:   Diagnosis Date    Hypertension     Seasonal allergies          PAST SURGICAL HISTORY  History reviewed. No pertinent surgical history.      FAMILY HISTORY  Family History   Problem Relation Age of Onset    Hypertension Mother     Parkinsonism Father          SOCIAL HISTORY  Social History     Socioeconomic History    Marital status:    Tobacco Use    Smoking status: Never    Smokeless tobacco: Never   Vaping Use    Vaping status: Never Used   Substance and Sexual Activity    Alcohol use: Yes     Comment: occasionally    Drug use: Never    Sexual activity: Yes     Partners: Female     Birth control/protection: Surgical         ALLERGIES  Patient has no known allergies.        REVIEW OF SYSTEMS    All systems reviewed and negative except for those discussed in HPI.       PHYSICAL EXAM    I have reviewed the triage vital signs and nursing notes.    ED Triage Vitals [24 0159]   Temp Heart Rate Resp BP SpO2   97.5 °F (36.4 °C) 80 12 (!) 169/111 96 %      Temp src Heart Rate Source Patient Position BP Location FiO2 (%)   Oral Monitor Sitting Left arm --         General: Moderate acute distress   Skin: normal color, warm and dry  Head: normocephalic, atraumatic  Eyes: Pupils equally round and reactive to light.  Nose: normal nasal mucosa, no visible deformity.  Mouth: moist mucous membranes.  Neck: supple.  Chest:  no retractions, no visible deformity  Cardiovascular: Regular rate and rhythm.  Lungs: clear to auscultation bilaterally.  Abdomen: soft, non-tender, non-distended. No rebound tenderness, no guarding.  No peritonitis.  Neuro:  alert and oriented x3, no focal neurological deficits.  Psych:  appropriate mood and behavior.        LAB RESULTS  Recent Results (from the past 24 hours)   Comprehensive Metabolic Panel    Collection Time: 12/04/24  2:19 AM    Specimen: Blood   Result Value Ref Range    Glucose 118 (H) 65 - 99 mg/dL    BUN 9 6 - 20 mg/dL    Creatinine 1.07 0.76 - 1.27 mg/dL    Sodium 135 (L) 136 - 145 mmol/L    Potassium 3.7 3.5 - 5.2 mmol/L    Chloride 97 (L) 98 - 107 mmol/L    CO2 25.0 22.0 - 29.0 mmol/L    Calcium 9.4 8.6 - 10.5 mg/dL    Total Protein 8.2 6.0 - 8.5 g/dL    Albumin 4.6 3.5 - 5.2 g/dL    ALT (SGPT) 57 (H) 1 - 41 U/L    AST (SGOT) 38 1 - 40 U/L    Alkaline Phosphatase 34 (L) 39 - 117 U/L    Total Bilirubin 1.2 0.0 - 1.2 mg/dL    Globulin 3.6 gm/dL    A/G Ratio 1.3 g/dL    BUN/Creatinine Ratio 8.4 7.0 - 25.0    Anion Gap 13.0 5.0 - 15.0 mmol/L    eGFR 90.5 >60.0 mL/min/1.73   Magnesium    Collection Time: 12/04/24  2:19 AM    Specimen: Blood   Result Value Ref Range    Magnesium 2.3 1.6 - 2.6 mg/dL   Acetaminophen Level    Collection Time: 12/04/24  2:19 AM    Specimen: Blood   Result Value Ref Range    Acetaminophen <5.0 0.0 - 30.0 mcg/mL   Ethanol    Collection Time: 12/04/24  2:19 AM    Specimen: Blood   Result Value Ref Range    Ethanol <10 0 - 10 mg/dL    Ethanol % <0.010 %   Salicylate Level    Collection Time: 12/04/24  2:19 AM    Specimen: Blood   Result Value Ref Range    Salicylate <0.3 <=30.0 mg/dL   TSH    Collection Time: 12/04/24  2:19 AM    Specimen: Blood   Result Value Ref Range    TSH 4.530 (H) 0.270 - 4.200 uIU/mL   Green Top (Gel)    Collection Time: 12/04/24  2:19 AM   Result Value Ref Range    Extra Tube Hold for add-ons.    Lavender Top    Collection Time: 12/04/24  2:19  AM   Result Value Ref Range    Extra Tube hold for add-on    Gold Top - SST    Collection Time: 12/04/24  2:19 AM   Result Value Ref Range    Extra Tube Hold for add-ons.    Light Blue Top    Collection Time: 12/04/24  2:19 AM   Result Value Ref Range    Extra Tube Hold for add-ons.    CBC Auto Differential    Collection Time: 12/04/24  2:19 AM    Specimen: Blood   Result Value Ref Range    WBC 11.54 (H) 3.40 - 10.80 10*3/mm3    RBC 5.75 4.14 - 5.80 10*6/mm3    Hemoglobin 17.4 13.0 - 17.7 g/dL    Hematocrit 49.1 37.5 - 51.0 %    MCV 85.4 79.0 - 97.0 fL    MCH 30.3 26.6 - 33.0 pg    MCHC 35.4 31.5 - 35.7 g/dL    RDW 12.2 (L) 12.3 - 15.4 %    RDW-SD 37.5 37.0 - 54.0 fl    MPV 9.7 6.0 - 12.0 fL    Platelets 263 140 - 450 10*3/mm3    Neutrophil % 64.7 42.7 - 76.0 %    Lymphocyte % 24.2 19.6 - 45.3 %    Monocyte % 10.0 5.0 - 12.0 %    Eosinophil % 0.2 (L) 0.3 - 6.2 %    Basophil % 0.4 0.0 - 1.5 %    Immature Grans % 0.5 0.0 - 0.5 %    Neutrophils, Absolute 7.47 (H) 1.70 - 7.00 10*3/mm3    Lymphocytes, Absolute 2.79 0.70 - 3.10 10*3/mm3    Monocytes, Absolute 1.15 (H) 0.10 - 0.90 10*3/mm3    Eosinophils, Absolute 0.02 0.00 - 0.40 10*3/mm3    Basophils, Absolute 0.05 0.00 - 0.20 10*3/mm3    Immature Grans, Absolute 0.06 (H) 0.00 - 0.05 10*3/mm3    nRBC 0.0 0.0 - 0.2 /100 WBC   Urinalysis With Microscopic If Indicated (No Culture) - Urine, Clean Catch    Collection Time: 12/04/24  2:34 AM    Specimen: Urine, Clean Catch   Result Value Ref Range    Color, UA Yellow Yellow, Straw    Appearance, UA Clear Clear    pH, UA 6.5 5.0 - 8.0    Specific Gravity, UA 1.007 1.005 - 1.030    Glucose, UA Negative Negative    Ketones, UA Trace (A) Negative    Bilirubin, UA Negative Negative    Blood, UA Negative Negative    Protein, UA Negative Negative    Leuk Esterase, UA Negative Negative    Nitrite, UA Negative Negative    Urobilinogen, UA 0.2 E.U./dL 0.2 - 1.0 E.U./dL   Urine Drug Screen - Urine, Clean Catch    Collection Time:  12/04/24  2:34 AM    Specimen: Urine, Clean Catch   Result Value Ref Range    THC, Screen, Urine Negative Negative    Phencyclidine (PCP), Urine Negative Negative    Cocaine Screen, Urine Negative Negative    Methamphetamine, Ur Negative Negative    Opiate Screen Negative Negative    Amphetamine Screen, Urine Negative Negative    Benzodiazepine Screen, Urine Negative Negative    Tricyclic Antidepressants Screen Negative Negative    Methadone Screen, Urine Negative Negative    Barbiturates Screen, Urine Negative Negative    Oxycodone Screen, Urine Negative Negative    Buprenorphine, Screen, Urine Negative Negative   Fentanyl, Urine - Urine, Clean Catch    Collection Time: 12/04/24  2:34 AM    Specimen: Urine, Clean Catch   Result Value Ref Range    Fentanyl, Urine Negative Negative       If labs were ordered, I independently reviewed the results and considered them in treating the patient.  See medical decision making discussion section for my interpretation of lab results.        RADIOLOGY  No Radiology Exams Resulted Within Past 24 Hours        PROCEDURES    Procedures    ECG 12 Lead Drug Monitoring   Final Result          MEDICATIONS GIVEN IN ER    Medications - No data to display      MEDICAL DECISION MAKING, PROGRESS, and CONSULTS    All labs, if obtained, have been independently reviewed by me.  All radiology studies, if obtained, have been reviewed by me and the radiologist dictating the report.  All EKG's, if obtained, have been independently viewed and interpreted by me/my attending physician.      Discussion below represents my analysis of pertinent findings related to patient's condition, differential diagnosis, treatment plan and final disposition.                         Medical Decision Making Discussion:    Vitals reviewed and demonstrate hypertension but otherwise normal.    EKG obtained based my independent reading shows normal sinus rhythm without any acute ischemic changes.  First-degree AV block.   Normal QRS and QT intervals.    Labs reviewed and are all unremarkable.    Dr. Nelson accepted the patient to [facility].    Additional sources:    - Discussed/ obtained information from independent historians:  Behavioral Health    - External (non-ED) record review: Behavioral health note from December 2, 2024 documenting situational anxiety and depressive disorder.    Shared Decision Making:  After my consideration of clinical presentation and any laboratory/radiology studies obtained, I discussed the findings with the patient/patient representative who is in agreement with the treatment plan and the final disposition.   Risks and benefits of discharge and/or observation/admission were discussed.    Orders placed during this visit:  Orders Placed This Encounter   Procedures    [place] Draw    Comprehensive Metabolic Panel    Magnesium    Acetaminophen Level    Ethanol    Salicylate Level    Urinalysis With Microscopic If Indicated (No Culture) - Urine, Clean Catch    Urine Drug Screen - Urine, Clean Catch    TSH    CBC Auto Differential    Fentanyl, Urine - Urine, Clean Catch    Vital Signs    Undress & Gown    Psych / Access to See    ECG 12 Lead Drug Monitoring    Suicide Precautions    CBC & Differential    Green Top (Gel)    Lavender Top    Gold Top - SST    Light Blue Top       AS OF 03:51 EST VITALS:    BP - 152/100  HR - 80  TEMP - 97.5 °F (36.4 °C) (Oral)  O2 SATS - 96%                  DIAGNOSIS  Final diagnoses:   Suicidal ideation         DISPOSITION  Admit to [facility]      Please note that portions of this document were completed with voice recognition software.        Skip Valdez MD  12/04/24 0352

## 2024-12-05 LAB — T4 FREE SERPL-MCNC: 1.35 NG/DL (ref 0.92–1.68)

## 2024-12-05 PROCEDURE — 99232 SBSQ HOSP IP/OBS MODERATE 35: CPT | Performed by: PSYCHIATRY & NEUROLOGY

## 2024-12-05 PROCEDURE — 99221 1ST HOSP IP/OBS SF/LOW 40: CPT | Performed by: NURSE PRACTITIONER

## 2024-12-05 RX ORDER — TRAZODONE HYDROCHLORIDE 50 MG/1
25 TABLET, FILM COATED ORAL NIGHTLY PRN
Status: DISCONTINUED | OUTPATIENT
Start: 2024-12-05 | End: 2024-12-11 | Stop reason: HOSPADM

## 2024-12-05 RX ORDER — LORAZEPAM 0.5 MG/1
1 TABLET ORAL ONCE
Status: COMPLETED | OUTPATIENT
Start: 2024-12-05 | End: 2024-12-05

## 2024-12-05 RX ORDER — HYDROCHLOROTHIAZIDE 12.5 MG/1
12.5 TABLET ORAL DAILY
Status: DISCONTINUED | OUTPATIENT
Start: 2024-12-05 | End: 2024-12-11 | Stop reason: HOSPADM

## 2024-12-05 RX ORDER — MULTIPLE VITAMINS W/ MINERALS TAB 9MG-400MCG
1 TAB ORAL DAILY
COMMUNITY

## 2024-12-05 RX ORDER — AMLODIPINE BESYLATE 5 MG/1
5 TABLET ORAL
Status: DISCONTINUED | OUTPATIENT
Start: 2024-12-05 | End: 2024-12-11 | Stop reason: HOSPADM

## 2024-12-05 RX ADMIN — HYDROCHLOROTHIAZIDE 12.5 MG: 12.5 CAPSULE ORAL at 08:47

## 2024-12-05 RX ADMIN — TRAZODONE HYDROCHLORIDE 25 MG: 50 TABLET ORAL at 21:05

## 2024-12-05 RX ADMIN — AMLODIPINE BESYLATE 5 MG: 5 TABLET ORAL at 08:47

## 2024-12-05 RX ADMIN — FLUOXETINE HYDROCHLORIDE 20 MG: 20 CAPSULE ORAL at 08:47

## 2024-12-05 RX ADMIN — LORAZEPAM 1 MG: 0.5 TABLET ORAL at 16:36

## 2024-12-05 NOTE — CONSULTS
[facility] HOSPITALIST   CONSULTATION      Name:  Dg Morse   Age:  39 y.o.  Sex:  male  :  1985  MRN:  [id number]   Visit Number:  [id number]  Admission Date:  2024  Date Of Service:  24  Primary Care Physician:  Tonny Oliveros DO    Consulting Physician:    KRIS Car    Referring Physician:    Dr. Wan    Reason For Consult:    Hypertension    Chief Complaint:     Suicidal ideations    History Of Presenting Illness:      Mr. Morse is a pleasant 39-year-old male with pertinent past medical history of hypertension who presented to emergency department due to depression and suicide attempt.  Patient taking HCTZ for high blood pressure.  States moved to [place] for employment from [place].  States overall feeling well today.  Denies chest pain, edema, or shortness of air.  Hospitalist consulted for uncontrolled hypertension.  Labs reviewed with TSH 4.5, otherwise nonactionable.    Review Of Systems:     All systems were reviewed and negative except for: Depression    Past Medical History: Patient  has a past medical history of Depression, Hypertension, and Seasonal allergies.    Past Surgical History: Patient  has no past surgical history on file.    Social History: Patient  reports that he has never smoked. He has never used smokeless tobacco. He reports current alcohol use. He reports that he does not use drugs.    Family History: Patient's family history has been reviewed and found to be noncontributory.     Allergies:      Patient has no known allergies.    Home Medications:    Prior to Admission Medications       Prescriptions Last Dose Informant Patient Reported? Taking?    albuterol sulfate  (90 Base) MCG/ACT inhaler Past Month  No Yes    Inhale 2 puffs Every 4 (Four) Hours As Needed for Wheezing.    Patient taking differently:  Inhale 2 puffs As Needed for Wheezing.    hydroCHLOROthiazide 25 MG tablet Past Month  No Yes    Take 1 tablet by  mouth Daily.    melatonin 5 MG tablet tablet Past Week  Yes Yes    Take 1 tablet by mouth At Night As Needed.    montelukast (SINGULAIR) 10 MG tablet Past Month  No Yes    Take 1 tablet by mouth Every Night.    multivitamin with minerals tablet tablet Past Week  Yes Yes    Take 1 tablet by mouth Daily.             Medication Review:     I have reviewed the patient's active and prn medications.      Vital Signs:    Temp:  [98 °F (36.7 °C)-98.5 °F (36.9 °C)] 98.5 °F (36.9 °C)  Heart Rate:  [77-86] 77  Resp:  [16] 16  BP: (115-132)/(80-98) 115/80        12/04/24  0445   Weight: 112 kg (247 lb)       Body mass index is 33.46 kg/m².    Physical Exam:     General Appearance:  Alert and cooperative.  Well-appearing middle-age male.   Head:  Atraumatic and normocephalic.   Eyes: Conjunctivae and sclerae normal, no icterus. No pallor.   Ears:  Ears with no abnormalities noted.   Throat: No oral lesions, no thrush, oral mucosa moist.   Neck: Supple, trachea midline, no thyromegaly.   Back:   No kyphoscoliosis present. No tenderness to palpation.   Lungs:   Breath sounds heard bilaterally equally.  No crackles or wheezing. No pleural rub or bronchial breathing.  Unlabored on room air.   Heart:  Normal S1 and S2, no murmur, no gallop, no rub. No JVD.   Abdomen:   Normal bowel sounds, no masses, no organomegaly. Soft, nontender, nondistended, no rebound tenderness.   Extremities: Supple, no edema, no cyanosis, no clubbing.   Pulses: Pulses palpable bilaterally.   Skin: No bleeding or rash.   Neurologic: Alert and oriented x 3. No facial asymmetry. Moves all four limbs. No tremors.      Laboratory data:    Results from last 7 days   Lab Units 12/04/24  0219   SODIUM mmol/L 135*   POTASSIUM mmol/L 3.7   CHLORIDE mmol/L 97*   CO2 mmol/L 25.0   BUN mg/dL 9   CREATININE mg/dL 1.07   CALCIUM mg/dL 9.4   BILIRUBIN mg/dL 1.2   ALK PHOS U/L 34*   ALT (SGPT) U/L 57*   AST (SGOT) U/L 38   GLUCOSE mg/dL 118*     Results from last 7 days    Lab Units 12/04/24  0219   WBC 10*3/mm3 11.54*   HEMOGLOBIN g/dL 17.4   HEMATOCRIT % 49.1   PLATELETS 10*3/mm3 263                             Results from last 7 days   Lab Units 12/04/24  0234   COLOR UA  Yellow   GLUCOSE UA  Negative   KETONES UA  Trace*   BLOOD UA  Negative   LEUKOCYTES UA  Negative   PH, URINE  6.5   BILIRUBIN UA  Negative   UROBILINOGEN UA  0.2 E.U./dL     Pain Management Panel          Latest Ref Rng & Units 12/4/2024   Pain Management Panel   Amphetamine, Urine Qual Negative Negative    Barbiturates Screen, Urine Negative Negative    Benzodiazepine Screen, Urine Negative Negative    Buprenorphine, Screen, Urine Negative Negative    Cocaine Screen, Urine Negative Negative    Fentanyl, Urine Negative Negative    Methadone Screen , Urine Negative Negative    Methamphetamine, Ur Negative Negative       Details                       EKG:      Sinus rhythm first-degree AV block heart rate 70    Radiology:    No radiology results for the last 3 days    Assessment:     Uncontrolled hypertension  Depression with suicide attempt    Recommendations/Plan:     -Decrease HCTZ and add Norvasc.  -Low-sodium diet.  -Appears blood pressure much more controlled this morning after medication adjustment.  -Reviewed therapeutic lifestyle changes.    Thank you for this consult. Please do not hesitate to call me for any questions.    Marie Kramer, APRN  12/05/24  14:14 EST    Dictated utilizing Dragon dictation.

## 2024-12-05 NOTE — PLAN OF CARE
Goal Outcome Evaluation:           Progress: improving  Outcome Evaluation: Patient calm and cooperative on this shift. Patient rates anxiety as 4/10 and depression as a 3/10. Patient denies SI, HI and AVH. Patient recieved a one time dose of ativan on this shift.

## 2024-12-05 NOTE — NURSING NOTE
MD notified of patient becoming anxious while in with the therapist.     New orders:    1 mg of ativan once.     Order read back and verified.

## 2024-12-05 NOTE — PLAN OF CARE
Goal Outcome Evaluation:  Plan of Care Reviewed With: patient  Plan of Care Reviewed With: patient  Patient Agreement with Plan of Care: agrees     Progress: improving     The pt presents with a flat affect and endorsed anxious and depressed mood.The pt rates both his depression and anxiety level a 4/10. He denies SI/HI/AVH, and PRN Trazodone and Atarax are administered. The pt is observed interacting appropriately with a peer while working on a puzzle. The pt reports that he was sleepy during the day due to lack of sleep. Reports improvement in his appetite.The pt is encouraged to make his needs known.

## 2024-12-05 NOTE — SIGNIFICANT NOTE
12/05/24 1349   Group Therapy Session   Group Attendance attended group session   Time Session Began 1000   Time Session Ended 1115   Total Time (minutes) 75   Group Type psychotherapeutic   Group Topic Covered cognitive therapy techniques;cognitive activities   Literature/Videos Given topic handouts   Literature/Videos Given Comments Stress Management   Group Session Detail Stress Management worksheet was designed to help patients learn about their own stressors, symptoms, and strategies to overcome stress. The coping strategies presented in this worksheet include the use of social support, emotional management, life balance, and meeting one's basic needs. This Stress Management discussion is intended to be used as tool to elicit conversation in a broader stress management treatment plan.   Patient Participation/Contribution cooperative with task;discussed personal experience with topic;expressed understanding of topic;listened actively;organized   Affect During Group affect consistent with mood   Degree of Insight moderate

## 2024-12-05 NOTE — THERAPY TREATMENT NOTE
DATA:    Therapist met with the patient individually. Therapist continues reviewing plan of care and aftercare plan.  The patient was agreeable.    ASSESSMENT:    Patient was seen for follow up of  SI.     Today, patient was seen 1-1 in office. The patient's mood appears appropriate to circumstances, affect congruent, thought content normal. Patient reports sleep is Good and appetite is Good. On scale 1-10, patient rates depression 3 and anxiety 5. Patient does/does not report hallucinations: None. Patient ventilates about the situation that he is in. Patient reports how he views himself and how he is disappointed with himself. Patient voices that he doesn't know why his wife is still wanting to work things out after everything he has put her through. Patient reports more details about things that have been going on with his relationship and affair.      CLINICAL MANEUVERING:    Therapist provided safe, secure environment for patient to share.  Provided reflective listening and psychoeducation.  Assisted patient in processing the above session. Assisted patient in identifying any questions or needs to be addressed today.  Therapist normalized and validated patient's feelings. Therapist attempted to challenge patient's negative thoughts. Therapist utilized supportive psychotherapy.    Plan:     Patient to remain hospitalized this date.      Treatment team will focus efforts on stabilizing patient's acute symptoms while providing education on healthy coping and crisis management to reduce hospitalizations.   Patient requires daily psychiatrist evaluation and 24/7 nursing supervision to promote patient  safety.     Therapist will offer 1-4 individual sessions, family education, and appropriate referral.     Therapist recommends continued assessment.

## 2024-12-06 PROCEDURE — 99232 SBSQ HOSP IP/OBS MODERATE 35: CPT | Performed by: PSYCHIATRY & NEUROLOGY

## 2024-12-06 RX ADMIN — HYDROCHLOROTHIAZIDE 12.5 MG: 12.5 CAPSULE ORAL at 08:34

## 2024-12-06 RX ADMIN — TRAZODONE HYDROCHLORIDE 25 MG: 50 TABLET ORAL at 20:24

## 2024-12-06 RX ADMIN — AMLODIPINE BESYLATE 5 MG: 5 TABLET ORAL at 08:33

## 2024-12-06 RX ADMIN — Medication 10 MG: at 20:25

## 2024-12-06 NOTE — SIGNIFICANT NOTE
"   12/06/24 1113   Group Therapy Session   Group Attendance attended group session   Time Session Began 1000   Time Session Ended 1100   Total Time (minutes) 60   Group Type psychotherapeutic   Group Topic Covered cognitive therapy techniques;cognitive activities   Literature/Videos Given topic handouts   Literature/Videos Given Comments Attitude Made Simple   Group Session Detail Patients participated in an attitude self-assessment. Then they were tasked to think of some negative thoughts and feelings that can contribute to a bad attitude and \"throw them away\". Then they were tasked with thinking of some encouraging, constructive and upbuilding thoughts that can help you to maintain a positive attitude and how to \"water\" the positive thoughts each day.   Patient Participation/Contribution cooperative with task;discussed personal experience with topic;expressed readiness to alter behaviors;expressed understanding of topic;listened actively;organized   Affect During Group affect consistent with mood   Degree of Insight moderate       "

## 2024-12-06 NOTE — PLAN OF CARE
Goal Outcome Evaluation:  Plan of Care Reviewed With: patient  Patient Agreement with Plan of Care: agrees     Progress: improving  Outcome Evaluation: Pt has been calm and cooperative on this shift. Pt rates anxiety 8/10 and depression 6/10. Pt denies all HRIs. PRNs: trazodone. Continue plan of care.

## 2024-12-06 NOTE — PLAN OF CARE
Goal Outcome Evaluation:  Plan of Care Reviewed With: patient  Plan of Care Reviewed With: patient  Patient Agreement with Plan of Care: agrees     Progress: improving                              Pt denies SI/HI/AVH. Pt reports his anxiety is 3/10 and depression is 3/10. Pt has attended groups and has been engaging with staff and others on th unit. Pt has remained calm and cooperative during this shift. Pt care plan is ongoing.

## 2024-12-06 NOTE — SIGNIFICANT NOTE
12/06/24 1204   Pertinent Diagnosis/Presenting Problems   Presenting Problems/Reason for Referral Suicidal ideation   Previous Problems Impacting Recreation Patient reported no perceived barriers to leisure participation.   Lifestyle/Recreational History/Interest   Profession/Job [employer]   Current or Previous  Service active duty, past    Service Experiences experienced combat   Current Living Situation with family   Transportation Situation car, drives self   Current Educational Level Some college, vocational school   Support Network Patient reported wife, mother and kids as biggest supports.   Recreational History and Interests   How Important is Recreation to You? high importance   Satisfied With Leisure Lifestyle? no   Participate Regularly in Leisure Activity? no   Are You a Social Person? no   Do You Prefer To Be Alone? yes   Do You Like New Experiences? yes   Current Hobbies/Interests outdoor activities   Outdoor Activities hiking;fishing;other (see comments)  (Being outdoors)   Past Hobbies/Interests outdoor activities   Outdoor Activities (Past Leisure Activity) other (see comments)  (Surfing, spear fishing and free diving)   Barriers To Leisure Activity   Barriers to Leisure Activity mental health concerns   Coping/Wellbeing   Describe Yourself Patient described himself as worthy and loved.   Personal Strengths Resilience, motivation, support, education   Current State of Wellbeing increased stressors;poor coping skills   How Do You Felt With Life Stressors? Patient reported that he tries to be still and sit down, think, breath and go for a walk or run when stressed.   Factors Impacting Current State of Wellbeing coping skills/strategies;domestic/family issues/disputes;increase in stressors   Therapeutic Recreation Participation   Recreation Therapy Participation  arts/crafts;exercise/fitness;games;meditation;music;puzzles;reading;television/movies/videos;trivia;writing/journaling/blogging   Art/Crafts drawing;painting   Exercise/Fitness strengthening/weight training;group exercise activity   Games board games;card games;brain games/fitness   Music listening to music   Reading books   Objectives of Recreation Participation motivation and activity level through successful participation;positive attitudes leading to a healthy leisure lifestyle   Orientation to Therapeutic Recreation patient;received explanation of recreation therapy programs;completed therapeutic recreation assessment   Recreation Therapy Summary of Participation Patient pleasant and able to complete assessment. Patient identified that he would like to start going to [place] again with his family after discharge. Patient did not identify any other leisure goals for hospitalization.   Therapeutic Recreation Assessment/Plan   Patient/Family Goal (Therapeutic Recreation) Increase awareness and utilization of leisure activities as a form of emotional expression.  Increase knowledge and utilization of leisure activities as coping skills  Decrease symptom burden through leisure participation  Improve mood and reduce anxiety   Recreation Therapy Goals/Objectives coping skills/strategies;functional leisure skills;health promotion;health maintenance;leisure skills/knowledge;leisure awareness;leisure participation;problem-solving;relaxation response   Recreation Plan structured leisure participation   Referrals to Community Recreation Programs List available on request

## 2024-12-06 NOTE — PROGRESS NOTES
INPATIENT PSYCHIATRIC PROGRESS NOTE    Name:  Dg Morse  :  1985  MRN:  [id number]  Visit Number:  [id number]  Hospital Day: 1 day    This provider is located at home address on file with [facility]. This visit is being done on behalf of [facility] using a secure platform for a video visit in a secure and private environment. The Patient is located at [facility]-on a locked Behavioral Health unit. The patient's condition being diagnosed/treated is appropriate for telemedicine. The provider identified self as well as credentials. The patient, and/or patient's guardian, consent to being seen remotely, and when consent is given they understand that the consent allows for patient identifiable information to be sent to a third party as needed. They may refuse to be seen remotely at any time. The electronic data is encrypted and password protected, and the patient and/or guardian has been advised of the potential risks to privacy not withstanding such measures.    SUBJECTIVE    CC/Focus of Exam: Depression    INTERVAL HISTORY:   Patient seen chart reviewed and case discussed in treatment team. Staff report no major behavioral problems overnight.  Patient attending groups and appropriate with peers and staff on the unit.    PRNs overnight given: Hydroxyzine and Trazodone.    On interview today patient tells me that he is feeling a bit groggy today after taking the Trazodone at night.  He was agreeable to cutting it in half and have it PRN.    He reports feeling okay on Prozac.  States he has had some good conversations with his family who are supportive and just want him to get better he states.  He is tearful thinking about how it would have been for them if he had got through with his plan to end his life.     He relates the stressors still ahead of him.    He denies side effects at this time.       Review of Systems    No dizziness, no seizures, no headaches, no  nausea/vomiting.    OBJECTIVE      PHYSICAL EXAM:  Vital signs: Reviewed and listed below  Gait and station: Steady   Muscle tone/strength: Symmetrical movements of extremities    Temp:  [98.5 °F (36.9 °C)-99.1 °F (37.3 °C)] 99.1 °F (37.3 °C)  Heart Rate:  [74-84] 84  Resp:  [16-17] 17  BP: (115-142)/() 128/91    MENTAL STATUS EXAM:  Appearance: Casually dressed, fair grooming.   Behavior: Cooperative with interview, good eye contact  Psychomotor: No psychomotor agitation, No EPS reported or observed  Speech: Regular rate, rhythm and tone.  Mood: Depressed  Affect: Constricted  Thought Content: no delusional material present  Thought process: generally goal directed  Suicidality: See HPI   Homicidality: No HI  Perception: No AH/VH  Insight: fair   Judgment: limited       Lab Results (last 24 hours)       Procedure Component Value Units Date/Time    T4, Free [[id number]]  (Normal) Collected: 12/04/24 0219    Specimen: Blood Updated: 12/05/24 1509     Free T4 1.35 ng/dL                Imaging Results (Last 24 Hours)       ** No results found for the last 24 hours. **               ECG/EMG Results (most recent)       None             ALLERGIES: Patient has no known allergies.      Current Facility-Administered Medications:     acetaminophen (TYLENOL) tablet 650 mg, 650 mg, Oral, Q4H PRN, Shayy Nelson MD    aluminum-magnesium hydroxide-simethicone (MAALOX MAX) 400-400-40 MG/5ML suspension 15 mL, 15 mL, Oral, Q6H PRN, Shayy Nelson MD    amLODIPine (NORVASC) tablet 5 mg, 5 mg, Oral, Q24H, Marie Kramer APRN, 5 mg at 12/05/24 0847    FLUoxetine (PROzac) capsule 20 mg, 20 mg, Oral, Daily, Sara Wan MD, 20 mg at 12/05/24 0847    hydroCHLOROthiazide oral 12.5 mg, 12.5 mg, Oral, Daily, NiaMarie mills, APRN, 12.5 mg at 12/05/24 0847    hydrOXYzine (ATARAX) tablet 50 mg, 50 mg, Oral, Q6H PRN, Shayy Nelson MD, 50 mg at 12/04/24 2026    loperamide (IMODIUM) capsule 2 mg, 2 mg, Oral, Q2H PRN, Shayy Nelson,  MD    magnesium hydroxide (MILK OF MAGNESIA) suspension 10 mL, 10 mL, Oral, Daily PRN, Shayy Nelson MD    melatonin tablet 10 mg, 10 mg, Oral, Nightly PRN, Sara Wan MD, 10 mg at 12/04/24 2027    polyethylene glycol (MIRALAX) packet 17 g, 17 g, Oral, Daily PRN, Shayy Nelson MD    traZODone (DESYREL) tablet 25 mg, 25 mg, Oral, Nightly PRN, Sara Wan MD, 25 mg at 12/05/24 2105          ASSESSMENT & PLAN:      Progress towards treatment plans and goals: Compliant with medications, attending groups, and individual therapy.  Rationale for continued level of care: Patient continues to need inpatient level of care for stabilization of psychiatric symptoms.  Patient would potentially decompensate further at a lower level of care.       Diagnosis:      Suicidal ideation    MDD (major depressive disorder), recurrent episode, moderate        Plan:    -Continue hospitalization for safety and stabilization.  -Continue current precautions and safety checks.  -Encourage participation in therapeutic activities on the unit to increase coping skills for stressful life events. Including group and individual therapy.  -Continue to discuss case in treatment team meetings and continue discharge planning.  -Risk/benefit/alternatives to medications have been discussed with the patient.  We will continue to monitor for negative and positive effects to medications.   -Patient agrees with the following plan for today:    Medications:  Continue Prozac 20mgs daily    Other:  Medical consult service addressed uncontrolled HTN.       amLODIPine, 5 mg, Oral, Q24H  FLUoxetine, 20 mg, Oral, Daily  hydroCHLOROthiazide, 12.5 mg, Oral, Daily          I spent 18 minutes before, during and after on this encounter date of service, discussing case with treatment team, reviewing chart, interviewing and evaluating the patient, educating and counseling the patient, assessing patients immediate risk, weighing risks associated with most  appropriate level of care, formulating assessment and plan, documentation, writing orders, and communication with RN and staff.      Psychiatrist:  Sara Wan MD  12/05/24  22:33 EST

## 2024-12-06 NOTE — THERAPY TREATMENT NOTE
DATA:    Therapist met with the patient individually. Therapist continues reviewing plan of care and aftercare plan.  The patient was agreeable.    ASSESSMENT:    Patient was seen for follow up of SI.      Today, patient was seen 1-1 in office. The patient's mood appears appropriate to circumstances, affect congruent, thought content normal. Patient reports sleep is Good and appetite is Good. On scale 1-10, patient rates depression 1 and anxiety 1. Patient does/does not report hallucinations: None. Patient reports that he is feeling better today. Patient reports that he is going to put his family first going forward. Patient expresses that he is going to work on communicating more honestly with his wife. Patient reports that things just kept piling up and he was trying to be strong. Patient ventilates about the situation that he has been in.     CLINICAL MANEUVERING:    Therapist provided safe, secure environment for patient to share.  Provided reflective listening and psychoeducation.  Assisted patient in processing the above session. Assisted patient in identifying any questions or needs to be addressed today. Therapist normalized and validated patient's feelings. Therapist utilized supportive psychotherapy. Therapist utilized psychoeducation to increase awareness of behaviors.    Plan:     Patient to remain hospitalized this date.      Treatment team will focus efforts on stabilizing patient's acute symptoms while providing education on healthy coping and crisis management to reduce hospitalizations.   Patient requires daily psychiatrist evaluation and 24/7 nursing supervision to promote patient  safety.     Therapist will offer 1-4 individual sessions, family education, and appropriate referral.     Therapist recommends continued assessment.

## 2024-12-06 NOTE — SIGNIFICANT NOTE
12/06/24 1011   Group Therapy Session   Group Attendance attended group session   Time Session Began 0900   Time Session Ended 0930   Total Time (minutes) 30   Group Type recreation   Group Topic Covered cognitive activities   Group Session Detail Patient participated in activity to promote critical thinking skills and working in a team.   Patient Participation/Contribution able to recall/repeat info presented;cooperative with task;discussed personal experience with topic;expressed understanding of topic;listened actively;offered helpful suggestions to peers;organized   Affect During Group affect consistent with mood;appropriate to situation   Degree of Insight high

## 2024-12-06 NOTE — PROGRESS NOTES
"INPATIENT PSYCHIATRIC PROGRESS NOTE    Name:  Dg Morse  :  1985  MRN:  [id number]  Visit Number:  [id number]  Hospital Day: 2 days    This provider is located at home address on file with [facility]. This visit is being done on behalf of [facility] using a secure platform for a video visit in a secure and private environment. The Patient is located at [facility]-on a locked Behavioral Health unit. The patient's condition being diagnosed/treated is appropriate for telemedicine. The provider identified self as well as credentials. The patient, and/or patient's guardian, consent to being seen remotely, and when consent is given they understand that the consent allows for patient identifiable information to be sent to a third party as needed. They may refuse to be seen remotely at any time. The electronic data is encrypted and password protected, and the patient and/or guardian has been advised of the potential risks to privacy not withstanding such measures.    SUBJECTIVE    CC/Focus of Exam: Depression    INTERVAL HISTORY:   Patient seen chart reviewed and case discussed in treatment team. Patient attending groups and appropriate with peers and staff on the unit.    PRNs overnight given: Hydroxyzine and melatonin    Staff reports that patient struggled yesterday afternoon after conversation with his wife.  Apparently there have been additional things posted on fine disclosing his extramarital affair which has caused a great deal of stress for both patient and his wife.   On interview today patient asked about discharge as he says he has a lot of things he has to repair.  Discussed with patient how his stressors are as bad if not larger compared to when he originally came in.   Patient denies problems with medication, then states \" this is something I just have to do on my own\".       Review of Systems    No dizziness, no seizures, no headaches, no " nausea/vomiting.    OBJECTIVE      PHYSICAL EXAM:  Vital signs: Reviewed and listed below  Gait and station: Steady   Muscle tone/strength: Symmetrical movements of extremities    Temp:  [98 °F (36.7 °C)-99.1 °F (37.3 °C)] 98 °F (36.7 °C)  Heart Rate:  [72-84] 72  Resp:  [16-17] 16  BP: (128-142)/() 131/94    MENTAL STATUS EXAM:  Appearance: Casually dressed, fair grooming.   Behavior: Cooperative with interview, good eye contact  Psychomotor: No psychomotor agitation, No EPS reported or observed  Speech: Regular rate, rhythm and tone.  Mood: Depressed  Affect: Constricted  Thought Content: no delusional material present  Thought process: generally goal directed  Suicidality: See HPI   Homicidality: No HI  Perception: No AH/VH  Insight: limited  Judgment: limited       Lab Results (last 24 hours)       ** No results found for the last 24 hours. **               Imaging Results (Last 24 Hours)       ** No results found for the last 24 hours. **               ECG/EMG Results (most recent)       None             ALLERGIES: Patient has no known allergies.      Current Facility-Administered Medications:     acetaminophen (TYLENOL) tablet 650 mg, 650 mg, Oral, Q4H PRN, Shayy Nelson MD    aluminum-magnesium hydroxide-simethicone (MAALOX MAX) 400-400-40 MG/5ML suspension 15 mL, 15 mL, Oral, Q6H PRN, Shayy Nelson MD    amLODIPine (NORVASC) tablet 5 mg, 5 mg, Oral, Q24H, Marie Kramer APRN, 5 mg at 12/06/24 0833    FLUoxetine (PROzac) capsule 20 mg, 20 mg, Oral, Daily, Sara Wan MD, 20 mg at 12/05/24 0847    hydroCHLOROthiazide oral 12.5 mg, 12.5 mg, Oral, Daily, Marei Kramer APRN, 12.5 mg at 12/06/24 0834    hydrOXYzine (ATARAX) tablet 50 mg, 50 mg, Oral, Q6H PRN, Shayy Nelson MD, 50 mg at 12/04/24 2026    loperamide (IMODIUM) capsule 2 mg, 2 mg, Oral, Q2H PRN, Shayy Nelson MD    magnesium hydroxide (MILK OF MAGNESIA) suspension 10 mL, 10 mL, Oral, Daily PRN, Shayy Nelson MD    melatonin tablet  10 mg, 10 mg, Oral, Nightly PRN, Sara Wan MD, 10 mg at 12/04/24 2027    polyethylene glycol (MIRALAX) packet 17 g, 17 g, Oral, Daily PRN, Shayy Nelson MD    traZODone (DESYREL) tablet 25 mg, 25 mg, Oral, Nightly PRN, Sara Wan MD, 25 mg at 12/05/24 2105          ASSESSMENT & PLAN:      Progress towards treatment plans and goals: Compliant with medications, attending groups, and individual therapy.  Rationale for continued level of care: Patient continues to need inpatient level of care for stabilization of psychiatric symptoms.  Patient would potentially decompensate further at a lower level of care.       Diagnosis:      Suicidal ideation    MDD (major depressive disorder), recurrent episode, moderate        Plan:    -Continue hospitalization for safety and stabilization.  -Continue current precautions and safety checks.  -Encourage participation in therapeutic activities on the unit to increase coping skills for stressful life events. Including group and individual therapy.  -Continue to discuss case in treatment team meetings and continue discharge planning.  -Risk/benefit/alternatives to medications have been discussed with the patient.  We will continue to monitor for negative and positive effects to medications.   -Patient agrees with the following plan for today:    Medications:  Continue Prozac 20mgs daily      amLODIPine, 5 mg, Oral, Q24H  FLUoxetine, 20 mg, Oral, Daily  hydroCHLOROthiazide, 12.5 mg, Oral, Daily          I spent 17 minutes before, during and after on this encounter date of service, discussing case with treatment team, reviewing chart, interviewing and evaluating the patient, educating and counseling the patient, assessing patients immediate risk, weighing risks associated with most appropriate level of care, formulating assessment and plan, documentation, writing orders, and communication with RN and staff.      Psychiatrist:  Sara Wan MD  12/06/24  17:32 EST

## 2024-12-06 NOTE — SIGNIFICANT NOTE
12/06/24 1633   Group Therapy Session   Group Attendance attended group session   Time Session Began 1500   Time Session Ended 1600   Total Time (minutes) 60   Group Type recreation   Group Topic Covered leisure exploration/use of leisure time;relaxation techniques   Group Session Detail Patient participated in watercolor painting activity to promote relaxation.   Patient Participation/Contribution able to recall/repeat info presented;cooperative with task;discussed personal experience with topic;expressed understanding of topic;listened actively;offered helpful suggestions to peers;organized   Affect During Group affect consistent with mood;appropriate to situation   Degree of Insight high

## 2024-12-07 RX ADMIN — Medication 10 MG: at 20:55

## 2024-12-07 RX ADMIN — HYDROCHLOROTHIAZIDE 12.5 MG: 12.5 CAPSULE ORAL at 08:33

## 2024-12-07 RX ADMIN — AMLODIPINE BESYLATE 5 MG: 5 TABLET ORAL at 08:33

## 2024-12-07 NOTE — PROGRESS NOTES
Inpatient Psych Progress Note     Clinician: Miko Ibarra MD  Admission Date: 12/4/2024  13:45 EST 12/07/24    Behavioral Health Treatment Plan and Problem List: I have reviewed and approved the Behavioral Health Treatment Plan and Problem list.    Allergies  No Known Allergies    Hospital Day: 3 days      Assessment completed within view of staff    History  CC/clinical focus: Depression     Interval HPI: Patient seen and evaluated by me.  Chart reviewed. Discussed with treatment team and unit staff. No major issues overnight.  Patient still not wanting to take the Prozac. No PRNs required. Pt has been participating in therapeutic activities without issue. Interactions with staff and peers have been appropriate.     On interview today, patient was engaging during assessment.  We discussed the events that led to his hospitalization.  He reports this has been a learning experience for him.  He reports being on the unit has been beneficial for him.  He has had a chance to reflect on the things he did wrong.  He wants to work on rectifying these things.  He has been able able to sleep better,eat more and feels energized again.  He has been talking to his wife.  They are both going to do some therapy.  I discussed with him his reluctance to take the Prozac. He reports feeling like he does not need it. I encouraged him to reconsider his decision.  He promised to think more on this.  He reports going to groups has been helpful for him.  He reports his mood as more thoughtful today.  He denies suicidal or homicidal thoughts.  He denies auditory or visual hallucinations.  His goal for today is to be closer to God.    ROS otherwise as below.      Interval Review of Systems:   General ROS: negative for - fever or malaise  Endocrine ROS: negative for - palpitations  Respiratory ROS: no cough, shortness of breath, or wheezing  Cardiovascular ROS: no chest pain or dyspnea on exertion  Gastrointestinal ROS: no abdominal pain,  "no black or bloody stools    /96 (BP Location: Left arm, Patient Position: Sitting)   Pulse 79   Temp 98.3 °F (36.8 °C) (Oral)   Resp 17   Ht 183 cm (72.05\")   Wt 112 kg (247 lb)   SpO2 96%   BMI 33.46 kg/m²     Mental Status Exam  Behavior: Cooperative  Psychomotor activity: Calm  Orientation: x4  Mood: Thoughtful  Affect: mood congruent  Thought Process: linear, organized  Thought Content: No delusions voiced  Hallucinations: Denies AVH, no RIS observed  Concentration: Okay  Suicidal Ideation: Denies  Homicidal Ideation: Denies  Hopelessness: Moderate  Insight: Fair  Judgement: Fair      Medical Decision Making:   Labs:     Lab Results (last 24 hours)       ** No results found for the last 24 hours. **              Radiology:     Imaging Results (Last 24 Hours)       ** No results found for the last 24 hours. **              EKG:     ECG/EMG Results (most recent)       None             Medications:  amLODIPine, 5 mg, Oral, Q24H  FLUoxetine, 20 mg, Oral, Daily  hydroCHLOROthiazide, 12.5 mg, Oral, Daily           All medications reviewed.    Assessment and Plan:     Suicidal ideation    MDD (major depressive disorder), recurrent episode, moderate      Plan:   -Continue hospitalization for safety and stabilization.  -Continue current precautions and safety checks.  -Encourage participation in therapeutic activities on the unit to increase coping skills for stressful life events. Including group and individual therapy.  -Continue to discuss case in treatment team meetings and continue discharge planning.  -Risk/benefit/alternatives to medications have been discussed with the patient.  We will continue to monitor for negative and positive effects to medications.   -Patient agrees with the following plan for today:     Medications: Continue current medication without change.      Continue hospitalization for safety and stabilization.  Continue current level of Special Precautions (q15 minute checks).    Miko " MD Fred   December 7, 2024 13:57 EST

## 2024-12-07 NOTE — SIGNIFICANT NOTE
12/07/24 1424   Group Therapy Session   Group Attendance attended group session   Time Session Began 1600   Time Session Ended 1630   Total Time (minutes) 30   Group Type psychotherapeutic   Group Topic Covered cognitive activities   Literature/Videos Given topic handouts   Literature/Videos Given Comments Self-Care Assessment   Group Session Detail Patients participated in a worksheet to help patient reflect on their current self-care practices, recognize areas where they could improve, and generate ideas for new self-care activities they would enjoy. Patients participated in group discussion about the importance of self-care and discussing tips that include setting specific goals, setting boundaries to protect self-care, making self-care a habit, and more.   Patient Participation/Contribution cooperative with task   Patient Participation Detail Literature provided to be completed independently   Affect During Group affect consistent with mood   Degree of Insight moderate

## 2024-12-07 NOTE — PLAN OF CARE
Goal Outcome Evaluation:  Plan of Care Reviewed With: patient  Plan of Care Reviewed With: patient  Patient Agreement with Plan of Care: agrees     Progress: improving                              Pt denies SI/HI/AVH. Pt reports his anxiety is 0/10 and depression 0/10. Pt has remained calm and cooperative this shift. Pts care plan is ongoing.

## 2024-12-07 NOTE — THERAPY TREATMENT NOTE
DATA:    Therapist met with the patient individually. Therapist continues reviewing plan of care and aftercare plan. The patient was agreeable.    ASSESSMENT:    Patient was seen for follow up of suicidal ideations.     Today, patient was seen 1-1 in office. The patient's mood appears appropriate to circumstances, affect congruent, thought content normal. Patient reports sleep is good and appetite is good. On scale 1-10, patient rates depression 0 and anxiety 0. Patient denies SI/HI/AVH. Patient processes his feelings of shame and how he has reached this low. Patient reports that he is coming to accept what is out of his control. Patient is forward-thinking and vocalizes his plans/ideas for how to move forward: weekly therapy, couples counseling, becoming involved in his [employer] for support. Patient is tearful.     CLINICAL MANEUVERING:    Therapist provided safe, secure environment for patient to share. Provided reflective listening and psychoeducation. Assisted patient in processing the above session. Assisted patient in identifying any questions or needs to be addressed today. Therapist normalized and validated patient's feelings. Provided supportive psychotherapy.      Plan:     Patient to remain hospitalized this date.      Treatment team will focus efforts on stabilizing patient's acute symptoms while providing education on healthy coping and crisis management to reduce hospitalizations. Patient requires daily psychiatrist evaluation and 24/7 nursing supervision to promote patient safety.     Therapist will offer 1-4 individual sessions, family education, and appropriate referral.     Therapist recommends continued assessment.

## 2024-12-07 NOTE — PLAN OF CARE
Goal Outcome Evaluation:  Plan of Care Reviewed With: patient  Plan of Care Reviewed With: patient  Patient Agreement with Plan of Care: agrees        Outcome Evaluation: no acute events during shift at this time. VSS. pt denies SI/HI/AVH/anxiety/depression. PRN trazodone and melatonin given. no other changes in pt condition at this time. continue plan of care.

## 2024-12-07 NOTE — SIGNIFICANT NOTE
12/07/24 1131   Group Therapy Session   Group Attendance attended group session   Time Session Began 1000   Time Session Ended 1100   Total Time (minutes) 60   Group Type psychotherapeutic;recreation   Group Topic Covered cognitive activities   Literature/Videos Given topic handouts   Literature/Videos Given Comments [place]   Group Session Detail Patients participated in a cognitive and team building activity which encouraged discussion and problem-solving.   Patient Participation/Contribution cooperative with task;expressed understanding of topic;listened actively;organized;offered helpful suggestions to peers   Affect During Group affect consistent with mood   Degree of Insight moderate

## 2024-12-08 RX ORDER — HYDROXYZINE HYDROCHLORIDE 25 MG/1
25 TABLET, FILM COATED ORAL EVERY 6 HOURS PRN
Status: DISCONTINUED | OUTPATIENT
Start: 2024-12-08 | End: 2024-12-11 | Stop reason: HOSPADM

## 2024-12-08 RX ADMIN — TRAZODONE HYDROCHLORIDE 25 MG: 50 TABLET ORAL at 20:17

## 2024-12-08 RX ADMIN — AMLODIPINE BESYLATE 5 MG: 5 TABLET ORAL at 08:26

## 2024-12-08 RX ADMIN — HYDROCHLOROTHIAZIDE 12.5 MG: 12.5 CAPSULE ORAL at 08:25

## 2024-12-08 RX ADMIN — Medication 10 MG: at 20:16

## 2024-12-08 RX ADMIN — HYDROXYZINE HYDROCHLORIDE 50 MG: 25 TABLET ORAL at 10:08

## 2024-12-08 NOTE — PROGRESS NOTES
"    Inpatient Psych Progress Note     Clinician: Miko Ibarra MD  Admission Date: 12/4/2024  13:45 EST 12/08/24    Behavioral Health Treatment Plan and Problem List: I have reviewed and approved the Behavioral Health Treatment Plan and Problem list.    Allergies  No Known Allergies    Hospital Day: 4 days      Assessment completed within view of staff    History  CC/clinical focus: Depression     Interval HPI: Patient seen and evaluated by me.  Chart reviewed. Discussed with treatment team and unit staff. No major issues overnight.  Patient still not wanting to take the Prozac.  Hydroxyzine and melatonin PRNs required. Pt has been participating in therapeutic activities without issue. Interactions with staff and peers have been appropriate.     Patient was engaging in assessment this morning.  His wife visited yesterday.  They had a good visit.  They talked about the game plan for what to do when he leaves here.  He reports still having a lot of anxiety when he thinks about dealing with the aftermath of what happened when he leaves here.  He reported having a lot of anxiety this morning.  He required Vistaril.  He reports that Vistaril made him feel sedated.  We discussed the option of taking half a tablet.  He was amenable to this.  He is still not amenable to taking Prozac.  He has also reports his mood as okay.  He denies suicidal or homicidal thoughts.  He denies auditory or visual hallucinations.      ROS otherwise as below.    Interval Review of Systems:   General ROS: negative for - fever or malaise  Endocrine ROS: negative for - palpitations  Respiratory ROS: no cough, shortness of breath, or wheezing  Cardiovascular ROS: no chest pain or dyspnea on exertion  Gastrointestinal ROS: no abdominal pain, no black or bloody stools    /89 (BP Location: Right arm, Patient Position: Sitting)   Pulse 73   Temp 98.4 °F (36.9 °C) (Oral)   Resp 17   Ht 183 cm (72.05\")   Wt 112 kg (247 lb)   SpO2 95%   BMI " 33.46 kg/m²     Mental Status Exam  Behavior: Cooperative  Psychomotor activity: Calm  Orientation: x4  Mood: Okay  Affect: mood congruent  Thought Process: linear, organized  Thought Content: No delusions voiced  Hallucinations: Denies AVH, no RIS observed  Concentration: Okay  Suicidal Ideation: Denies  Homicidal Ideation: Denies  Hopelessness: Moderate  Insight: Fair  Judgement: Fair      Medical Decision Making:   Labs:     Lab Results (last 24 hours)       ** No results found for the last 24 hours. **              Radiology:     Imaging Results (Last 24 Hours)       ** No results found for the last 24 hours. **              EKG:     ECG/EMG Results (most recent)       None             Medications:  amLODIPine, 5 mg, Oral, Q24H  FLUoxetine, 20 mg, Oral, Daily  hydroCHLOROthiazide, 12.5 mg, Oral, Daily           All medications reviewed.    Assessment and Plan:     Suicidal ideation    MDD (major depressive disorder), recurrent episode, moderate      Plan:   -Continue hospitalization for safety and stabilization.  -Continue current precautions and safety checks.  -Encourage participation in therapeutic activities on the unit to increase coping skills for stressful life events. Including group and individual therapy.  -Continue to discuss case in treatment team meetings and continue discharge planning.  -Risk/benefit/alternatives to medications have been discussed with the patient.  We will continue to monitor for negative and positive effects to medications.   -Patient agrees with the following plan for today:     Medications: Reduced the Vistaril dose from 50 mg every 6 hours as needed to 25 mg every 6 hours as needed for anxiety.      Continue hospitalization for safety and stabilization.  Continue current level of Special Precautions (q15 minute checks).    Miko Ibarra MD   December 8, 2024 15:14 EST

## 2024-12-08 NOTE — PLAN OF CARE
Goal Outcome Evaluation:           Progress: improving  Outcome Evaluation: Patient has been calm and cooperative on this shift. Patient rates anxiety as a 4/10 and depression as a 0/10. Patient denies SI, HI and AVH.

## 2024-12-08 NOTE — SIGNIFICANT NOTE
12/08/24 1023   Group Therapy Session   Group Attendance attended group session   Time Session Began 0930   Time Session Ended 1000   Total Time (minutes) 30   Group Type recreation   Group Topic Covered cognitive activities   Group Session Detail Patient participated in activity to promote critical thinking skills and communication skills.   Patient Participation/Contribution able to recall/repeat info presented;cooperative with task;discussed personal experience with topic;expressed understanding of topic;listened actively;offered helpful suggestions to peers;organized   Affect During Group affect consistent with mood;appropriate to situation   Degree of Insight high

## 2024-12-08 NOTE — SIGNIFICANT NOTE
12/08/24 1244   Group Therapy Session   Group Attendance attended group session   Time Session Began 1130   Time Session Ended 1200   Total Time (minutes) 30   Group Type recreation   Group Topic Covered structured socialization   Group Session Detail Patient participated in activity to promote learning how to find commonalities with others.   Patient Participation/Contribution able to recall/repeat info presented;cooperative with task;discussed personal experience with topic;expressed understanding of topic;listened actively;offered helpful suggestions to peers;organized   Affect During Group affect consistent with mood;appropriate to situation   Degree of Insight high

## 2024-12-08 NOTE — PLAN OF CARE
Goal Outcome Evaluation:  Plan of Care Reviewed With: patient  Plan of Care Reviewed With: patient  Patient Agreement with Plan of Care: agrees     Progress: improving  Outcome Evaluation: no acute events during shift at this time. VSS. pt denies SI/HI/AVH/anxiety/depression. PRN melatonin given. no other changes in pt condition at this time. continue plan of care.

## 2024-12-09 PROCEDURE — 99232 SBSQ HOSP IP/OBS MODERATE 35: CPT | Performed by: PSYCHIATRY & NEUROLOGY

## 2024-12-09 RX ADMIN — FLUOXETINE HYDROCHLORIDE 20 MG: 20 CAPSULE ORAL at 08:43

## 2024-12-09 RX ADMIN — TRAZODONE HYDROCHLORIDE 25 MG: 50 TABLET ORAL at 20:09

## 2024-12-09 RX ADMIN — AMLODIPINE BESYLATE 5 MG: 5 TABLET ORAL at 08:42

## 2024-12-09 RX ADMIN — Medication 10 MG: at 20:09

## 2024-12-09 RX ADMIN — HYDROCHLOROTHIAZIDE 12.5 MG: 12.5 CAPSULE ORAL at 08:42

## 2024-12-09 NOTE — PLAN OF CARE
Goal Outcome Evaluation:  Plan of Care Reviewed With: patient  Plan of Care Reviewed With: patient  Patient Agreement with Plan of Care: agrees     Progress: improving                            Pt denies SI/HI/AVH. Pt reports anxiety is 0/10 and depression is 0/10. Pt has remained calm and cooperative this shift. Pt has been attending group and engaging with staff and others on the unit. Pts care plan is ongoing.

## 2024-12-09 NOTE — PLAN OF CARE
Goal Outcome Evaluation:  Plan of Care Reviewed With: patient  Plan of Care Reviewed With: patient  Patient Agreement with Plan of Care: agrees        Outcome Evaluation: Pt denies anxiety, depression, SI, HI and AVH at this time. Pt interacting well with staff and peers but at times isolates. PRN trazodone and melatonin given overnight and pt slept well. Will continue plan of care.

## 2024-12-09 NOTE — SIGNIFICANT NOTE
12/09/24 1016   Group Therapy Session   Group Attendance attended group session   Time Session Began 0900   Time Session Ended 1000   Total Time (minutes) 60   Group Type recreation   Group Topic Covered goal setting;emotions/expression   Group Session Detail Patient participated in journaling exercise to highlight positive change in their lives and ways to achieve that change. Patients discussed benefits of journaling as a coping skill.   Patient Participation/Contribution able to recall/repeat info presented;cooperative with task;discussed personal experience with topic;expressed understanding of topic;listened actively;offered helpful suggestions to peers;organized   Affect During Group affect consistent with mood;appropriate to situation   Degree of Insight high

## 2024-12-09 NOTE — PROGRESS NOTES
INPATIENT PSYCHIATRIC PROGRESS NOTE    Name:  Dg Morse  :  1985  MRN:  [id number]  Visit Number:  [id number]  Hospital Day: 5 days    This provider is located at home address on file with [facility]. This visit is being done on behalf of [facility] using a secure platform for a video visit in a secure and private environment. The Patient is located at [facility]-on a locked Behavioral Health unit. The patient's condition being diagnosed/treated is appropriate for telemedicine. The provider identified self as well as credentials. The patient, and/or patient's guardian, consent to being seen remotely, and when consent is given they understand that the consent allows for patient identifiable information to be sent to a third party as needed. They may refuse to be seen remotely at any time. The electronic data is encrypted and password protected, and the patient and/or guardian has been advised of the potential risks to privacy not withstanding such measures.    SUBJECTIVE    CC/Focus of Exam: Depression, anxiety, suicidal ideations    INTERVAL HISTORY:   Patient seen chart reviewed and case discussed in treatment team. Staff report no major behavioral problems overnight.  Patient attending groups and appropriate with peers and staff on the unit.    PRNs overnight given: Melatonin, trazodone    On interview today patient tells me that he is doing okay he has been going to groups he has been having the conversations with his wife related to the relationship and states that they would like to work things out.  He reports he is trying not to focus on the stressors of the Facebook post and circulating indicating his affair, states that it is too much for him to worry about this stage.  We talked about his medication and he had been prescribed Prozac last week for his depression as he had stated feeling extremely depressed leading to this hospitalization following plans  to shoot himself with a gun.  Today he tells me that he does believe that the depressed mood that he was experiencing is more situational however he is really struggling with anxiety.  We discussed options and ultimately decided that it may be a good plan for him to have a retrial of the Prozac.  The patient is agreeable.  Despite denying current suicidal ideation patient is benefiting from hospitalization and the groups on the unit, he has been utilizing this time in a safe environment to have the tough conversations with his wife that led to his plan to shoot himself.        Review of Systems    No dizziness, no seizures, no headaches, no nausea/vomiting.    OBJECTIVE      PHYSICAL EXAM:  Vital signs: Reviewed and listed below  Gait and station: Steady   Muscle tone/strength: Symmetrical movements of extremities    Temp:  [97.7 °F (36.5 °C)] 97.7 °F (36.5 °C)  Heart Rate:  [67-90] 67  Resp:  [16-18] 16  BP: (124-128)/(88-92) 124/92    MENTAL STATUS EXAM:  Appearance: Casually dressed, fair grooming.   Behavior: Cooperative with interview, good eye contact  Psychomotor: No psychomotor agitation, No EPS reported or observed  Speech: Regular rate, rhythm and tone.  Mood: Anxiety  Affect: Congruent  Thought Content: no delusional material present  Thought process: generally goal directed  Suicidality: See HPI   Homicidality: No HI  Perception: No AH/VH  Insight: fair   Judgment: limited       Lab Results (last 24 hours)       ** No results found for the last 24 hours. **               Imaging Results (Last 24 Hours)       ** No results found for the last 24 hours. **               ECG/EMG Results (most recent)       None             ALLERGIES: Patient has no known allergies.      Current Facility-Administered Medications:     acetaminophen (TYLENOL) tablet 650 mg, 650 mg, Oral, Q4H PRN, Shayy Nelson MD    aluminum-magnesium hydroxide-simethicone (MAALOX MAX) 400-400-40 MG/5ML suspension 15 mL, 15 mL, Oral, Q6H PRN,  Shayy Nelson MD    amLODIPine (NORVASC) tablet 5 mg, 5 mg, Oral, Q24H, NiaMarie mills, APRN, 5 mg at 12/09/24 0842    FLUoxetine (PROzac) capsule 20 mg, 20 mg, Oral, Daily, Sara Wan MD, 20 mg at 12/09/24 0843    hydroCHLOROthiazide oral 12.5 mg, 12.5 mg, Oral, Daily, NiaMarie mills APRN, 12.5 mg at 12/09/24 0842    hydrOXYzine (ATARAX) tablet 25 mg, 25 mg, Oral, Q6H PRN, Miko Ibarra MD    loperamide (IMODIUM) capsule 2 mg, 2 mg, Oral, Q2H PRN, Shayy Nelson MD    magnesium hydroxide (MILK OF MAGNESIA) suspension 10 mL, 10 mL, Oral, Daily PRN, Shayy Nelson MD    melatonin tablet 10 mg, 10 mg, Oral, Nightly PRN, Sara Wan MD, 10 mg at 12/08/24 2016    polyethylene glycol (MIRALAX) packet 17 g, 17 g, Oral, Daily PRN, Shayy Nelson MD    traZODone (DESYREL) tablet 25 mg, 25 mg, Oral, Nightly PRN, Sara Wan MD, 25 mg at 12/08/24 2017          ASSESSMENT & PLAN:      Progress towards treatment plans and goals: attending groups, and individual therapy, patient has agreed to medications.  Rationale for continued level of care: Patient continues to need inpatient level of care for stabilization of psychiatric symptoms given the seriousness of the plan to shoot himself that lead to this hospitalization. Given the fact that his life stressors will be ongoing once he is discharged, patient would benefit from continued hospitalization to improve coping schools and continued medication management.        Diagnosis:      Suicidal ideation    MDD (major depressive disorder), recurrent episode, moderate        Plan:    -Continue hospitalization for safety and stabilization.  -Continue current precautions and safety checks.  -Encourage participation in therapeutic activities on the unit to increase coping skills for stressful life events. Including group and individual therapy.  -Continue to discuss case in treatment team meetings and continue discharge planning.  -Risk/benefit/alternatives to  medications have been discussed with the patient.  We will continue to monitor for negative and positive effects to medications.   -Patient agrees with the following plan for today:    Medications: Prozac 20     Other: Discussed discharge planning with treatment team as well as patient.   Will plan to discharge him on Wednesday 12/11/24.      amLODIPine, 5 mg, Oral, Q24H  FLUoxetine, 20 mg, Oral, Daily  hydroCHLOROthiazide, 12.5 mg, Oral, Daily          I spent 18 minutes before, during and after on this encounter date of service, discussing case with treatment team, reviewing chart, interviewing and evaluating the patient, educating and counseling the patient, assessing patients immediate risk, weighing risks associated with most appropriate level of care, formulating assessment and plan, documentation, writing orders, and communication with RN and staff.      Psychiatrist:  Sara Wan MD  12/09/24  15:16 EST

## 2024-12-09 NOTE — THERAPY TREATMENT NOTE
DATA:    Therapist met with the patient individually. Therapist continues reviewing plan of care and aftercare plan.  The patient was agreeable.    ASSESSMENT:    Patient was seen for follow up of SI.     Today, patient was seen 1-1 in office. The patient's mood appears appropriate to circumstances, affect congruent, thought content normal. Patient reports sleep is Good and appetite is Good. On scale 1-10, patient rates depression  0  and anxiety  0 . Patient does/does not report hallucinations: None. Patient reports that he is doing okay. Patient reports that visitation went well with his wife. Patient voices some anxiety about leaving and seeing the messages he has been sent and those posted to his page regarding the situation. Patient reports that he has been debating on reading the messages or not.     CLINICAL MANEUVERING:    Therapist provided safe, secure environment for patient to share.  Provided reflective listening and psychoeducation.  Assisted patient in processing the above session. Assisted patient in identifying any questions or needs to be addressed today. Therapist normalized and validated patient's feelings. Therapist utilized supportive psychotherapy. Therapist provided psychoeducation on a cost/benefits analysis.    Concern was voiced regarding substance use and educated patient on risks associated with use. Patient was advised to abstain from use and educated on community resources that can help with sobriety and recovery.        Plan:     Patient to remain hospitalized this date.      Treatment team will focus efforts on stabilizing patient's acute symptoms while providing education on healthy coping and crisis management to reduce hospitalizations.   Patient requires daily psychiatrist evaluation and 24/7 nursing supervision to promote patient  safety.     Therapist will offer 1-4 individual sessions, family education, and appropriate referral.     Therapist recommends continued assessment.

## 2024-12-09 NOTE — SIGNIFICANT NOTE
12/09/24 1722   Group Therapy Session   Group Attendance attended group session   Time Session Began 1600   Time Session Ended 1630   Total Time (minutes) 30   Group Type psychotherapeutic   Group Topic Covered cognitive therapy techniques;cognitive activities   Literature/Videos Given topic handouts   Literature/Videos Given Comments Gentry Exploration   Group Session Detail Patients participated in the exploration of their relationship boundaries with the Boundaries Exploration worksheet. In this activity, patients will be asked to think of a particular relationship, and the quality of their boundaries within that relationship. The second half of the worksheet asks questions that are designed to help patients begin considering ways to improve their boundaries.   Patient Participation/Contribution cooperative with task   Patient Participation Detail Literature provided to be completed independently.   Affect During Group affect consistent with mood   Degree of Insight moderate

## 2024-12-09 NOTE — SIGNIFICANT NOTE
12/09/24 1319   Group Therapy Session   Group Attendance attended group session   Time Session Began 1130   Time Session Ended 1210   Total Time (minutes) 40   Group Type recreation   Group Topic Covered leisure exploration/use of leisure time;self care activities   Group Session Detail Patient explored watercolor painting as a potential hobby and coping skill.   Patient Participation/Contribution able to recall/repeat info presented;cooperative with task;discussed personal experience with topic;expressed understanding of topic;listened actively;offered helpful suggestions to peers;organized   Affect During Group affect consistent with mood;appropriate to situation   Degree of Insight high

## 2024-12-09 NOTE — SIGNIFICANT NOTE
12/09/24 1157   Group Therapy Session   Group Attendance attended group session   Time Session Began 1000   Time Session Ended 1100   Total Time (minutes) 60   Group Type psychotherapeutic;psychoeducation   Group Topic Covered cognitive therapy techniques;cognitive activities   Literature/Videos Given topic handouts   Literature/Videos Given Comments Setting Boundaries   Group Session Detail Setting Boundaries worksheet helps teach the patients to set healthy boundaries by covering language for speaking assertively, boundary-setting tips, examples, and practice exercises. Then explored Healthy Boundaries Tips worksheet neatly presents standard advice for creating healthy boundaries. Topics include values, assertiveness, setting limits, and more. Each topic is described in a simple and brief manner, creating a great starting point for group discussion and psychoeducation.   Patient Participation/Contribution cooperative with task;discussed personal experience with topic;expressed understanding of topic;listened actively;organized   Affect During Group affect consistent with mood   Degree of Insight moderate

## 2024-12-10 PROCEDURE — 99232 SBSQ HOSP IP/OBS MODERATE 35: CPT | Performed by: PSYCHIATRY & NEUROLOGY

## 2024-12-10 RX ORDER — FLUOXETINE 10 MG/1
10 CAPSULE ORAL DAILY
Status: DISCONTINUED | OUTPATIENT
Start: 2024-12-10 | End: 2024-12-11 | Stop reason: HOSPADM

## 2024-12-10 RX ADMIN — AMLODIPINE BESYLATE 5 MG: 5 TABLET ORAL at 08:48

## 2024-12-10 RX ADMIN — HYDROCHLOROTHIAZIDE 12.5 MG: 12.5 CAPSULE ORAL at 08:48

## 2024-12-10 RX ADMIN — TRAZODONE HYDROCHLORIDE 25 MG: 50 TABLET ORAL at 20:25

## 2024-12-10 RX ADMIN — FLUOXETINE HYDROCHLORIDE 10 MG: 10 CAPSULE ORAL at 11:35

## 2024-12-10 RX ADMIN — Medication 10 MG: at 20:26

## 2024-12-10 NOTE — PLAN OF CARE
Goal Outcome Evaluation:           Progress: improving  Outcome Evaluation: Patient has been calm and cooperative on this shift. Patient rates anxiety as a 1/10 and depression as a 0/10. Patient denies SI, HI and AVH.

## 2024-12-10 NOTE — SIGNIFICANT NOTE
12/10/24 1334   Group Therapy Session   Group Attendance attended group session   Time Session Began 1130   Time Session Ended 1200   Total Time (minutes) 30   Group Type recreation   Group Topic Covered emotions/expression   Group Session Detail Patient participated in activity to promote knowledge of gardening techniques and how to grow personal traits.   Patient Participation/Contribution able to recall/repeat info presented;cooperative with task;discussed personal experience with topic;expressed understanding of topic;listened actively;offered helpful suggestions to peers;organized   Affect During Group affect consistent with mood;appropriate to situation   Degree of Insight high

## 2024-12-10 NOTE — CASE MANAGEMENT/SOCIAL WORK
SW completed FMLA paperwork and submitted to provider for signature, which she completed and sent back to SW. SW printed completed paperwork and gave to PT.     Electronically Signed By:  Care Bella Cuevas MSW    Date/Time: 12/10/24 16:42

## 2024-12-10 NOTE — PROGRESS NOTES
INPATIENT PSYCHIATRIC PROGRESS NOTE    Name:  Dg Morse  :  1985  MRN:  [id number]  Visit Number:  [id number]  Hospital Day: 6 days    This provider is located at home address on file with [facility]. This visit is being done on behalf of [facility] using a secure platform for a video visit in a secure and private environment. The Patient is located at [facility]-on a locked Behavioral Health unit. The patient's condition being diagnosed/treated is appropriate for telemedicine. The provider identified self as well as credentials. The patient, and/or patient's guardian, consent to being seen remotely, and when consent is given they understand that the consent allows for patient identifiable information to be sent to a third party as needed. They may refuse to be seen remotely at any time. The electronic data is encrypted and password protected, and the patient and/or guardian has been advised of the potential risks to privacy not withstanding such measures.    SUBJECTIVE    CC/Focus of Exam: Depression, anxiety, suicidal ideations    INTERVAL HISTORY:   Patient seen chart reviewed and case discussed in treatment team. Staff report no major behavioral problems overnight.  Patient attending groups and appropriate with peers and staff on the unit.    PRNs overnight given: Melatonin, trazodone    On interview today patient reports that he took the Prozac yesterday but then feels sleepy after her words.  He then refused Prozac this morning and states that he does not like to feel that way.  He is agreeable to taking a lower dose of Prozac however to see if that would help him bypassed the feeling.  He denies suicidal ideation at this time.  He feels good about the plan for discharge tomorrow when his wife is able to get him.    Review of Systems    No dizziness, no seizures, no headaches, no nausea/vomiting.    OBJECTIVE      PHYSICAL EXAM:  Vital signs: Reviewed and  listed below  Gait and station: Steady   Muscle tone/strength: Symmetrical movements of extremities    Temp:  [98.3 °F (36.8 °C)] 98.3 °F (36.8 °C)  Heart Rate:  [69-82] 69  Resp:  [14-18] 14  BP: (130-134)/(85-94) 130/85    MENTAL STATUS EXAM:  Appearance: Casually dressed, fair grooming.   Behavior: Cooperative with interview, good eye contact  Psychomotor: No psychomotor agitation, No EPS reported or observed  Speech: Regular rate, rhythm and tone.  Mood: Anxiety  Affect: Congruent  Thought Content: no delusional material present  Thought process: generally goal directed  Suicidality: See HPI   Homicidality: No HI  Perception: No AH/VH  Insight: fair   Judgment: limited       Lab Results (last 24 hours)       ** No results found for the last 24 hours. **               Imaging Results (Last 24 Hours)       ** No results found for the last 24 hours. **               ECG/EMG Results (most recent)       None             ALLERGIES: Patient has no known allergies.      Current Facility-Administered Medications:     acetaminophen (TYLENOL) tablet 650 mg, 650 mg, Oral, Q4H PRN, Shayy Nelson MD    aluminum-magnesium hydroxide-simethicone (MAALOX MAX) 400-400-40 MG/5ML suspension 15 mL, 15 mL, Oral, Q6H PRN, Shayy Nelson MD    amLODIPine (NORVASC) tablet 5 mg, 5 mg, Oral, Q24H, Nia, Marie J, APRN, 5 mg at 12/10/24 0848    FLUoxetine (PROzac) capsule 10 mg, 10 mg, Oral, Daily, Sara Wan MD, 10 mg at 12/10/24 1135    hydroCHLOROthiazide oral 12.5 mg, 12.5 mg, Oral, Daily, Nia, Marie J, APRN, 12.5 mg at 12/10/24 0848    hydrOXYzine (ATARAX) tablet 25 mg, 25 mg, Oral, Q6H PRN, Miko Ibarra MD    loperamide (IMODIUM) capsule 2 mg, 2 mg, Oral, Q2H PRN, Shayy Nelson MD    magnesium hydroxide (MILK OF MAGNESIA) suspension 10 mL, 10 mL, Oral, Daily PRN, Shayy Nelson MD    melatonin tablet 10 mg, 10 mg, Oral, Nightly PRN, Sara Wan MD, 10 mg at 12/09/24 2009    polyethylene glycol (MIRALAX) packet 17 g,  17 g, Oral, Daily PRN, Shayy Nelson MD    traZODone (DESYREL) tablet 25 mg, 25 mg, Oral, Nightly PRN, Sara Wan MD, 25 mg at 12/09/24 2009          ASSESSMENT & PLAN:      Progress towards treatment plans and goals: attending groups, and individual therapy, patient has agreed to medications.  Rationale for continued level of care: Patient continues to need inpatient level of care for stabilization of psychiatric symptoms given the seriousness of the plan to shoot himself that lead to this hospitalization. Given the fact that his life stressors will be ongoing once he is discharged, patient would benefit from continued hospitalization to improve coping schools and continued medication management.        Diagnosis:      Suicidal ideation    MDD (major depressive disorder), recurrent episode, moderate        Plan:    -Continue hospitalization for safety and stabilization.  -Continue current precautions and safety checks.  -Encourage participation in therapeutic activities on the unit to increase coping skills for stressful life events. Including group and individual therapy.  -Continue to discuss case in treatment team meetings and continue discharge planning.  -Risk/benefit/alternatives to medications have been discussed with the patient.  We will continue to monitor for negative and positive effects to medications.   -Patient agrees with the following plan for today:    Medications: Decrease Prozac to 10 mg daily, monitor for side effects     Other: Plan for discharge tomorrow      amLODIPine, 5 mg, Oral, Q24H  FLUoxetine, 10 mg, Oral, Daily  hydroCHLOROthiazide, 12.5 mg, Oral, Daily          I spent 18 minutes before, during and after on this encounter date of service, discussing case with treatment team, reviewing chart, interviewing and evaluating the patient, educating and counseling the patient, assessing patients immediate risk, weighing risks associated with most appropriate level of care, formulating  assessment and plan, documentation, writing orders, and communication with RN and staff.      Psychiatrist:  Sara Wan MD  12/10/24  14:23 EST

## 2024-12-10 NOTE — SIGNIFICANT NOTE
12/10/24 1331   Group Therapy Session   Group Attendance attended group session   Time Session Began 1000   Time Session Ended 1113   Total Time (minutes) 73   Group Type psychotherapeutic;psychoeducation   Group Topic Covered cognitive activities;cognitive therapy techniques   Literature/Videos Given topic handouts   Literature/Videos Given Comments Anxiety Psychoeducation   Group Session Detail Patients participated in an exercise providing psychoeducation on anxiety. Patients identified labels that they identified with anxiety and why the label stood out to them. Patients participated in a group discussion about the common themes they experience anxiety/worry over. Patients then reviewed the different types of anxiety. Finally, patients identified coping strategies to manage symptoms of anxiety.   Patient Participation/Contribution cooperative with task;discussed personal experience with topic;expressed understanding of topic;listened actively;organized   Affect During Group affect consistent with mood   Degree of Insight moderate

## 2024-12-10 NOTE — SIGNIFICANT NOTE
12/10/24 0957   Group Therapy Session   Group Attendance attended group session   Time Session Began 0900   Time Session Ended 0940   Total Time (minutes) 40   Group Type recreation   Group Topic Covered cognitive activities;problem solving   Group Session Detail Patient participated in activity to promote critial thinking and problem solving skills. Patient participated in discussion about the long term benefits of solving puzzles on a dialy basis to preserve cognitive functioning.   Patient Participation/Contribution able to recall/repeat info presented;cooperative with task;discussed personal experience with topic;expressed understanding of topic;listened actively;offered helpful suggestions to peers;organized   Affect During Group affect consistent with mood;appropriate to situation   Degree of Insight high

## 2024-12-10 NOTE — THERAPY TREATMENT NOTE
DATA:    Therapist met with the patient individually. Therapist continues reviewing plan of care and aftercare plan.  The patient was agreeable.    ASSESSMENT:    Patient was seen for follow up of SI.    Today, patient was seen 1-1 in office. The patient's mood appears appropriate to circumstances, affect congruent, thought content normal. Patient reports sleep is Good and appetite is Good. On scale 1-10, patient rates depression 1 and anxiety 1. Patient does/does not report hallucinations: None. Patient denies SI/HI/AVH. Patient reports that he is most likely going to stay off social media. Patient reports that he is feeling ready but also nervous getting back into the day to day. Patient reports that he is going to continue to focus in repairing his relationships.     CLINICAL MANEUVERING:    Therapist provided safe, secure environment for patient to share.  Provided reflective listening and psychoeducation.  Assisted patient in processing the above session. Assisted patient in identifying any questions or needs to be addressed today. Therapist normalized and validated patient's feelings. Therapist utilized supportive psychotherapy.    Concern was voiced regarding substance use and educated patient on risks associated with use. Patient was advised to abstain from use and educated on community resources that can help with sobriety and recovery.        Plan:     Patient to remain hospitalized this date.      Treatment team will focus efforts on stabilizing patient's acute symptoms while providing education on healthy coping and crisis management to reduce hospitalizations.   Patient requires daily psychiatrist evaluation and 24/7 nursing supervision to promote patient  safety.     Therapist will offer 1-4 individual sessions, family education, and appropriate referral.     Therapist recommends continued assessment.

## 2024-12-10 NOTE — PLAN OF CARE
Goal Outcome Evaluation:  Plan of Care Reviewed With: patient  Patient Agreement with Plan of Care: agrees     Progress: improving  Outcome Evaluation: Pt has been calm and cooperative on this shift. Pt rates anxiety and depression 0/10. Pt denies SI/HI/AVH. PRNs: trazodone and melatonin. Continue plan of care.

## 2024-12-11 ENCOUNTER — TELEPHONE (OUTPATIENT)
Dept: INTERNAL MEDICINE | Facility: CLINIC | Age: 39
End: 2024-12-11
Payer: COMMERCIAL

## 2024-12-11 ENCOUNTER — TELEMEDICINE (OUTPATIENT)
Dept: PSYCHIATRY | Facility: CLINIC | Age: 39
End: 2024-12-11
Payer: COMMERCIAL

## 2024-12-11 VITALS
DIASTOLIC BLOOD PRESSURE: 83 MMHG | SYSTOLIC BLOOD PRESSURE: 123 MMHG | TEMPERATURE: 97.8 F | BODY MASS INDEX: 33.46 KG/M2 | RESPIRATION RATE: 16 BRPM | HEART RATE: 75 BPM | OXYGEN SATURATION: 95 % | WEIGHT: 247 LBS | HEIGHT: 72 IN

## 2024-12-11 DIAGNOSIS — F43.23 SITUATIONAL MIXED ANXIETY AND DEPRESSIVE DISORDER: Primary | ICD-10-CM

## 2024-12-11 PROBLEM — R45.851 SUICIDAL IDEATION: Status: RESOLVED | Noted: 2024-12-04 | Resolved: 2024-12-11

## 2024-12-11 PROBLEM — F33.1 MDD (MAJOR DEPRESSIVE DISORDER), RECURRENT EPISODE, MODERATE: Status: RESOLVED | Noted: 2024-12-04 | Resolved: 2024-12-11

## 2024-12-11 PROCEDURE — 99239 HOSP IP/OBS DSCHRG MGMT >30: CPT | Performed by: PSYCHIATRY & NEUROLOGY

## 2024-12-11 RX ORDER — HYDROXYZINE HYDROCHLORIDE 25 MG/1
25 TABLET, FILM COATED ORAL EVERY 6 HOURS PRN
Qty: 15 TABLET | Refills: 0 | Status: SHIPPED | OUTPATIENT
Start: 2024-12-11 | End: 2024-12-20 | Stop reason: SDUPTHER

## 2024-12-11 RX ORDER — FLUOXETINE 10 MG/1
10 CAPSULE ORAL DAILY
Qty: 15 CAPSULE | Refills: 0 | Status: SHIPPED | OUTPATIENT
Start: 2024-12-12 | End: 2024-12-20 | Stop reason: SDUPTHER

## 2024-12-11 RX ORDER — AMLODIPINE BESYLATE 5 MG/1
5 TABLET ORAL
Qty: 30 TABLET | Refills: 0 | Status: SHIPPED | OUTPATIENT
Start: 2024-12-12

## 2024-12-11 RX ORDER — TRAZODONE HYDROCHLORIDE 50 MG/1
25 TABLET, FILM COATED ORAL NIGHTLY PRN
Qty: 15 TABLET | Refills: 0 | Status: SHIPPED | OUTPATIENT
Start: 2024-12-11 | End: 2024-12-22

## 2024-12-11 RX ADMIN — HYDROCHLOROTHIAZIDE 12.5 MG: 12.5 CAPSULE ORAL at 09:28

## 2024-12-11 RX ADMIN — HYDROXYZINE HYDROCHLORIDE 25 MG: 25 TABLET ORAL at 02:34

## 2024-12-11 RX ADMIN — FLUOXETINE HYDROCHLORIDE 10 MG: 10 CAPSULE ORAL at 09:28

## 2024-12-11 RX ADMIN — AMLODIPINE BESYLATE 5 MG: 5 TABLET ORAL at 09:28

## 2024-12-11 NOTE — DISCHARGE SUMMARY
PSYCHIATRIC DISCHARGE SUMMARY     Patient Identification:  Name:  Dg Morse  Age:  39 y.o.  Sex:  male  :  1985  MRN:  [id number]  Visit Number:  [id number]    Patient is being seen on this date of discharge using telemedicine technology. This provider is located at her home address on file with [facility]. This visit is being done on behalf of [facility] using a secure platform for a video visit in a secure and private environment. The Patient is located at [facility]on a locked Behavioral Health unit. The patient's condition being diagnosed/treated is appropriate for telemedicine. The provider identified herself as well as her credentials. The patient, and/or patient's guardian, consent to being seen remotely, and when consent is given they understand that the consent allows for patient identifiable information to be sent to a third party as needed. They may refuse to be seen remotely at any time. The electronic data is encrypted and password protected, and the patient and/or guardian has been advised of the potential risks to privacy not withstanding such measures.      Date of Admission:2024   Date of Discharge:  2024    Discharge Diagnosis:Active Problems:    Adjustment disorder with mixed anxiety and depressed mood      Admission Diagnosis:  Suicidal ideation [R45.851]     Reason for Admission:  (Per Psychiatric Evaluation HPI)    HPI: Dg Morse is a 39 y.o. male who was admitted to [facility] on 2024 after presenting to the emergency room with complaints of suicidal ideation.  He reported having a loaded gun out and prior to using it thought of his children, which kept him from firing the time.     Patient medically cleared in ER prior to being accepted to [facility] for psychiatric care. EKG reviewed and signed off on by ER provider.  Admission labs found to be essentially unremarkable with the exception of the  following:     On Psychiatric Evaluation interview today patient reports that he has had a long-term relationship with the mother of his children and despite having been together near 20 years and they have been  for the last 4 years.  He reports that he has also been in a long-term relationship of the other woman for the past 2-1/2 years, and after attempts to end that second relationship, the woman became upset and started posting about their affair on line with pictures.  This has been quite stressful to the patient who is now concerned about returning to work as folks from work are starting to find out about his affair as well.  Patient reports that he had a gun out and thought about using it, but did not move forward with this plan out of concern for his children.  Patient reports feeling quite depressed and anxious and states that this relationship situation is this trigger for him as he typically is a happy-go-sayra type person.    Hospital Course      Dg Horowitz admitted to [facility].  Patient was acclimated to the unit rules and schedule.  A comprehensive psychiatric evaluation and treatment plan were complete and safety and comfort measures implemented.  Home medications were reconciled. KELLY was obtained and reviewed when indicated.    Physical examination was completed and admission labs and EKG reviewed in ER prior to admission. Lab results reviewed as part of admission. Physical exam on unit completed as indicated and documented in Admission Note.    During hospitalization, patient showed improvement during this hospitalization.  Patient was inconsistently compliant with Prozac medications.  He did participate in groups as well as individual therapy with Master level therapist.       Psychiatric Medication: Risk vs benefit and alternatives to medications were discussed with patient,   Psychiatric Medication changes include the followin) Patient  started on Prozac 20mgs. He quit taking this medication as after a few days on the unit he reported no longer feeling depressed.  He endorsed Anxiety and reported it was a problem for him. We talked about Prozac and how it can help with anxiety.   Restarted Prozac 20mgs.  Patient then reported feeling sleeping following taking the medication.  It was reduced to Prozac 10mgs daily. Which patient tolerated better.    2) Hydroxyzine and Trazodone PRN were available and patient utilized and indicated.    Medically patient blood pressures were initially elevated. Hospitalist consulted was placed and he was started on Norvasc 5mgs and continued on Hydrochlorothiazide.    At time of discharge, patient was medically stable, cognitively intact and showed improvement in psychiatric functioning.  Patient denied SI, thoughts of self-harm, or HI. Patient was future oriented.  Patient showed no signs or symptoms of overt psychosis and denied auditory or visual hallucinations at time of discharge.  Patient reported no negative side effects to their medication regiment.  Patient was sleeping through the night. Patient was calm and cooperative with staff and peers. Patient was able to perform ADLs without assistance.  No abnormal movements noted and stable gait observed.  It was felt by the treatment team that patient had reached maximum benefit from inpatient hospitalization, had adequately met the treatment goals, and consistently denies Suicidal Ideation for at least 48 hours prior to discharge.  Treatment team members agreed patient appropriate for discharge to a lower level of care.     Mental Status Exam upon discharge:   Mood was euthymic   Affect: appropriate and generally stable  Thought Content: no delusional material present.  Thought process: Generally goal directed and organized.  Suicidality: No SI  Homicidality: No HI  Perception: No AH/VH or overt signs of psychosis  Insight and Judgement: over all improved since  admission     Procedures Performed         Consults:   Consults       Date and Time Order Name Status Description    12/4/2024  9:46 PM Inpatient Hospitalist Consult              Pertinent Test Results:  Lab Results (last 72 hours)       ** No results found for the last 72 hours. **                EKG: Completed and reviewed in ER prior to admission.    Condition on Discharge: Improved     Vital Signs  Temp:  [97.8 °F (36.6 °C)] 97.8 °F (36.6 °C)  Heart Rate:  [75-84] 75  Resp:  [16-20] 16  BP: (123-133)/(83-89) 123/83      Discharge Disposition:  Home or Self Care    Discharge Medications:     Discharge Medications        New Medications        Instructions Start Date   amLODIPine 5 MG tablet  Commonly known as: NORVASC   5 mg, Oral, Every 24 Hours Scheduled   Start Date: December 12, 2024     FLUoxetine 10 MG capsule  Commonly known as: PROzac   10 mg, Oral, Daily   Start Date: December 12, 2024     hydrOXYzine 25 MG tablet  Commonly known as: ATARAX   25 mg, Oral, Every 6 Hours PRN      traZODone 50 MG tablet  Commonly known as: DESYREL   25 mg, Oral, Nightly PRN             Continue These Medications        Instructions Start Date   hydroCHLOROthiazide 25 MG tablet   25 mg, Oral, Daily      melatonin 5 MG tablet tablet   5 mg, Nightly PRN      multivitamin with minerals tablet tablet   1 tablet, Daily             Stop These Medications      albuterol sulfate  (90 Base) MCG/ACT inhaler  Commonly known as: PROVENTIL HFA;VENTOLIN HFA;PROAIR HFA     montelukast 10 MG tablet  Commonly known as: SINGULAIR              Discharge Diet:  Regular healthy balanced diet recommended.    Activity at Discharge:  As tolerated    Follow-up Appointments  Future Appointments   Date Time Provider Department Center   12/11/2024  3:30 PM Kasey Benitez LCSW [facility]   12/18/2024  2:00 PM Terra Barrera APRN [facility]   12/26/2024  4:30 PM Kasey Benitez LCSW [facility]   12/31/2024  4:30 PM Emmanuel  MARIAM Parks  [facility]           Test Results Pending at Discharge   None    Time: I spent   > 30 minutes on this discharge activity which included: face-to-face encounter with the patient, reviewing the data in the system, coordination of the care with the nursing staff as well as consultants, documentation, and entering orders.           Sara Morton MD  Psychiatrist   [facility]    This is electronically signed by Sara Morton MD

## 2024-12-11 NOTE — PLAN OF CARE
Goal Outcome Evaluation:  Plan of Care Reviewed With: patient  Patient Agreement with Plan of Care: agrees           Pt denies SI/HI/AVH and depression.  Pt rates anxiety 2/10.  Pt meets criteria for discharge.  Pt given discharge instructions including follow up appointments and medication directions, verbalized understanding.  Pt's belongings returned.  Pt awaiting ride to home.

## 2024-12-11 NOTE — CASE MANAGEMENT/SOCIAL WORK
12/11/24      Patient:  Dg Morse  YOB: 1985          To Whom It May Concern,    Dg Morse was absent from work due to hospitalization from 12/4/24 to 12/11/24.      Sincerely,        This document has been electronically signed by Sara Morton MD.  December 11, 2024 11:04 EST

## 2024-12-11 NOTE — SIGNIFICANT NOTE
12/11/24 0957   Group Therapy Session   Group Attendance attended group session   Time Session Began 0900   Time Session Ended 0945   Total Time (minutes) 45   Group Type recreation   Group Topic Covered mindfulness;relaxation techniques   Group Session Detail Patient participated in guided meditation activities and discussion to practice and highlight the benefits of mindfulness techniques.   Patient Participation/Contribution able to recall/repeat info presented;cooperative with task;discussed personal experience with topic;expressed understanding of topic;listened actively;offered helpful suggestions to peers;organized   Affect During Group affect consistent with mood;appropriate to situation   Degree of Insight high

## 2024-12-11 NOTE — THERAPY DISCHARGE NOTE
"Therapist met 1-1 in the office. Patient calm/cooperative, oriented x 4.  Patient noted to have appropriate affect, congruent mood, normal thought content. Patient denies SI/HI/AVH and acute symptoms.   Safety planning conducted; patient/family denies access to firearms, weapons, medications. Medications were recommended to be safe guarded and for family to administer with patient.     Assisted patient in identifying risk factors which would indicate the need for higher level of care including thoughts to harm self or others and/or self-harming behavior and encouraged patient to call 988, call 911, or present to the nearest emergency room should any of these events occur. Discussed crisis intervention services and means to access.  Patient adamantly and convincingly denies current suicidal or homicidal ideation or perceptual disturbance.    Therapist completed the following safety plan with the patient       SAFETY/MENTAL HEALTH PLAN    1. Recognizing warning signs: Warning signs that a crisis may be developing such as thoughts, images, mood, situation, behaviors:    \"When I start getting feelings of hopelessness. When I feel like I don't have anyone to turn to. Suicidal tendencies. Not eating or sleeping. Increased anxiety.\"    2. Internal coping strategies: Things that the patient can do to take their mind off problems without contacting another person such as relaxation techniques, physical activity, etc:    \"Listen to soft music, Listen to nature, go for a walk or hiking.\"     3. Socialization strategies for distraction and support: People and social settings that provide distraction or support - names or places, telephone:      Francoise (wife)Nasrin (mother)    4. Social contact for assistance in resolving crisis: People the patient can ask for help - names and telephone:    Francoise (wife)Nasrin (mother)     5. Professionals or agencies contacts to help resolve crisis: Professionals or agencies the " patient can contact during a crisis - clinician name/location/phone/emergency contact number, local urgent care services with address/phone, National Suicide Prevention Lifeline (988), Emergency contact 911:       [facility], 988, 911, crisis resources provided.     6. Means restriction: Secure sharps, medications, safeguard weapons, identified crisis plan, made multiple outpatient provider appointments for follow up care.

## 2024-12-11 NOTE — CASE MANAGEMENT/SOCIAL WORK
[facility] -  Discharge Note    SW confirmed appointments. Patient has appointments scheduled within 7 day window. Appointment times have been reviewed with PT. PT verbally acknowledges the set appointment times. The appointments scheduled are listed below and on pt discharge summary.    12/11/24 at 3:30pm- Therapy with Kasey Benitez  [facility]  MyChart Video Visit  [phone]    12/18/24 at 2:00pm- Medication Management with Terra Barrera  [facility]   [address] [place] [address] [place] [address] [id number] [phone]    Pt to discharge back to his primary address on file via transportation provided by his wife. Pt requested meds to beds. Per provider, PT was started on blood pressure medication during admission and suggested SW set up follow-up. SW called PT PCP, Dr. Oliveros, to schedule, but per reception, they were unable to schedule without talking to the provider and would call the PT once completed to schedule the appointment. PT voiced that he was comfortable with that plan.    SW provided PT with completed FMLA paperwork and MD work excuse for his employer. Per request, MATT additionally provided PT with resources for couples counseling. Pt reports his wife agreed to speak to a counselor through their Christianity, but was grateful for the resources in case it was not a good fit.     No other needs or concerns were expressed.    SW provided resources for the patient in the area. MATT also discussed the availability of emergency behavioral health services 24/7 at [facility] through the Emergency Department.  Assisted the patient in identifying risk factors that would indicate the need for higher level of care, such as thoughts to harm self or others and/or self-harming behavior(s). Encouraged patient to call 911, crisis hotlines, or present to the nearest emergency department should symptoms worsen, or in any crisis or emergency. Patient agreeable and  voiced understanding.        Electronically Signed By:    GURWINDER Tapia  Date/Time: 12/11/24 13:33

## 2024-12-11 NOTE — PLAN OF CARE
Goal Outcome Evaluation:  Plan of Care Reviewed With: patient  Plan of Care Reviewed With: patient  Patient Agreement with Plan of Care: agrees     Progress: improving

## 2024-12-11 NOTE — PROGRESS NOTES
"    Telehealth  Initial (Video) Visit      Date: 2024   Patient Name: Dg Morse  : 1985   MRN: [id number]   Time In: 3:37 pm      Time Out: 4:32 pm     Referring Physician: Tonny Oliveros DO    Chief Complaint:      ICD-10-CM ICD-9-CM   1. Situational mixed anxiety and depressive disorder [F43.23]  F43.23 309.28        Mode of Visit: Video  Location of patient: -HOME-  Location of provider: +HOME+  You have chosen to receive care through a telehealth visit.  The patient has signed the video visit consent form.  The visit included audio and video interaction. No technical issues occurred during this visit.    History of Present Illness: Dg Morse is a 39 y.o. male who I met with today thru a video visit for initial counseling for anxiety/depression; reviewed HIPAA and limits of. Dg reported he just got out of [facility] today, shared \"I am happy to be home\"; current symptoms including worrying, sometimes feeling overwhelmed; he rated anxiety at a 1/2 on a 1-10 scale where 1 is minimal and 10 is max, and depression at a 1/2 on the same scale.  Dg shared he is working to repair a strained significant relationship and wants to get his spirituality 'back on track'; discussed 1 additional stressor.  CBT was used to assist Dg with processing thoughts/feelings and to build effective coping strategies.      Objective     PHQ-9 Depression Screening  PHQ-9 Total Score: (Patient-Rptd) 9     EUGENE-7  Feeling nervous, anxious or on edge: (Patient-Rptd) Several days  Not being able to stop or control worrying: (Patient-Rptd) Several days  Worrying too much about different things: (Patient-Rptd) Several days  Trouble Relaxing: (Patient-Rptd) Several days  Being so restless that it is hard to sit still: (Patient-Rptd) Several days  Feeling afraid as if something awful might happen: (Patient-Rptd) Several days  Becoming easily annoyed or irritable: (Patient-Rptd) Several days  EUGENE 7 Total Score: " (Patient-Rptd) 7  If you checked any problems, how difficult have these problems made it for you to do your work, take care of things at home, or get along with other people: (Patient-Rptd) Somewhat difficult      Social History:   Social History     Socioeconomic History    Marital status:    Tobacco Use    Smoking status: Never    Smokeless tobacco: Never   Vaping Use    Vaping status: Never Used   Substance and Sexual Activity    Alcohol use: Yes     Comment: occasionally    Drug use: Never    Sexual activity: Yes     Partners: Female     Birth control/protection: Surgical        Past Medical History:   Past Medical History:   Diagnosis Date    Depression     Hypertension     Seasonal allergies        Past Surgical History: No past surgical history on file.    Family History:   Family History   Problem Relation Age of Onset    Hypertension Mother     Parkinsonism Father        Medications:     Current Outpatient Medications:     [START ON 12/12/2024] amLODIPine (NORVASC) 5 MG tablet, Take 1 tablet by mouth Daily., Disp: 30 tablet, Rfl: 0    [START ON 12/12/2024] FLUoxetine (PROzac) 10 MG capsule, Take 1 capsule by mouth Daily., Disp: 15 capsule, Rfl: 0    hydroCHLOROthiazide 25 MG tablet, Take 1 tablet by mouth Daily., Disp: 90 tablet, Rfl: 2    hydrOXYzine (ATARAX) 25 MG tablet, Take 1 tablet by mouth Every 6 (Six) Hours As Needed for Anxiety., Disp: 15 tablet, Rfl: 0    melatonin 5 MG tablet tablet, Take 1 tablet by mouth At Night As Needed., Disp: , Rfl:     multivitamin with minerals tablet tablet, Take 1 tablet by mouth Daily., Disp: , Rfl:     traZODone (DESYREL) 50 MG tablet, Take 0.5 tablets by mouth At Night As Needed for Sleep., Disp: 15 tablet, Rfl: 0  No current facility-administered medications for this visit.    Allergies:   No Known Allergies      Subjective     Mental Status Exam:   MENTAL STATUS EXAM   General Appearance:  Cleanly groomed and dressed  Eye Contact:  Good eye  contact  Attitude:  Cooperative and polite  Motor Activity:  Normal gait, posture  Muscle Strength:  Normal  Speech:  Normal rate, tone, volume  Language:  Spontaneous  Mood and affect:  Appropriate, mood congruent and anxious  Hopelessness:  Denies  Loneliness: Denies  Thought Process:  Logical  Associations/ Thought Content:  No delusions  Hallucinations:  None  Suicidal Ideations:  Not present  Homicidal Ideation:  Not present  Sensorium:  Alert and clear  Orientation:  Person, place, time and situation  Immediate Recall, Recent, and Remote Memory:  Intact  Attention Span/ Concentration:  Good  Fund of Knowledge:  Appropriate for age and educational level  Intellectual Functioning:  Average range  Insight:  Good  Judgement:  Good  Reliability:  Good  Impulse Control:  Good      Assessment / Plan      Visit Diagnosis/ Orders Placed this Visit:     ICD-10-CM ICD-9-CM   1. Situational mixed anxiety and depressive disorder [F43.23]  F43.23 309.28        SUICIDE RISK ASSESSMENT/CSSRS:  1. Does client have thoughts of suicide? Patient denies   2. Does client have intent for suicide? Patient denies   3. Does client have a current plan for suicide? Patient denies   4. History of suicide attempts: Patient denies   5. Family history of suicide or attempts: Patient denies   6. History of violent behaviors towards others or property or thoughts of committing suicide: Patient denies   7. History of sexual aggression toward others: Patient denies   8. Access to firearms or weapons: Patient denies     PLAN:  Safety: No acute safety concerns  Risk Assessment: Risk of self-harm acutely is low. Risk of self-harm chronically is also low, but could be further elevated in the event of treatment noncompliance and/or AODA.    Treatment Plan/Goals: Continue supportive psychotherapy efforts and medications as indicated. Treatment options discussed during today's visit. Patient ackowledged and verbally consented to continue with current  treatment plan and was educated on the importance of compliance with treatment and follow-up appointments. Patient seems reasonably able to adhere to treatment plan.      Allowed Patient to freely discuss issues  without interruption or judgment with unconditional positive regard, active listening skills, and empathy. Therapist provided a safe, confidential environment to facilitate the development of a positive therapeutic relationship and encouraged open, honest communication. Assisted Patient in identifying risk factors which would indicate the need for higher level of care including thoughts to harm self or others and/or self-harming behavior and encouraged Patient to contact this office, call 911, or present to the nearest emergency room should any of these events occur. Discussed crisis intervention services and means to access. Patient adamantly and convincingly denies current suicidal or homicidal ideation or perceptual disturbance. Assisted Patient in processing session content; acknowledged and normalized Patient’s thoughts, feelings, and concerns by utilizing a person-centered approach in efforts to build appropriate rapport and a positive therapeutic relationship with open and honest communication.          Follow Up:   Return in about 15 days (around 12/26/2024) for therapy session.      Kasey Benitez LCSW  [facility]

## 2024-12-11 NOTE — TELEPHONE ENCOUNTER
"Attempted to call, unable to leave a message.    Relay     \"Will need to schedule hospital follow up with an extender due to Dr Oliveros's limited schedule.\"    Please schedule.                 "

## 2024-12-11 NOTE — TELEPHONE ENCOUNTER
Patient is being discharged today from [facility] for behavioral health issues however he was started on new blood pressure medication while being in there and needs to get a hosptial follow up in one week with dr solano. No openings. Please advise.

## 2024-12-11 NOTE — PLAN OF CARE
Goal Outcome Evaluation:  Plan of Care Reviewed With: patient  Patient Agreement with Plan of Care: agrees     Progress: improving  Outcome Evaluation: Pt has been calm and cooperative on this shift. Pt denies anxiety, depression, SI, HI, and AVH. Pt woke from his sleep once overnight c/o anxiety. PRN atarax, trazodone, and melatonin administered. Continue plan of care.

## 2024-12-20 ENCOUNTER — OFFICE VISIT (OUTPATIENT)
Dept: INTERNAL MEDICINE | Facility: CLINIC | Age: 39
End: 2024-12-20
Payer: COMMERCIAL

## 2024-12-20 ENCOUNTER — OFFICE VISIT (OUTPATIENT)
Dept: PSYCHIATRY | Facility: CLINIC | Age: 39
End: 2024-12-20
Payer: COMMERCIAL

## 2024-12-20 VITALS
RESPIRATION RATE: 18 BRPM | TEMPERATURE: 97.9 F | DIASTOLIC BLOOD PRESSURE: 71 MMHG | WEIGHT: 246 LBS | HEIGHT: 72 IN | HEART RATE: 71 BPM | BODY MASS INDEX: 33.32 KG/M2 | SYSTOLIC BLOOD PRESSURE: 127 MMHG | OXYGEN SATURATION: 98 %

## 2024-12-20 VITALS
HEIGHT: 72 IN | OXYGEN SATURATION: 98 % | WEIGHT: 251.4 LBS | HEART RATE: 87 BPM | BODY MASS INDEX: 34.05 KG/M2 | DIASTOLIC BLOOD PRESSURE: 86 MMHG | SYSTOLIC BLOOD PRESSURE: 140 MMHG

## 2024-12-20 DIAGNOSIS — G47.00 INSOMNIA, UNSPECIFIED TYPE: ICD-10-CM

## 2024-12-20 DIAGNOSIS — F43.23 SITUATIONAL MIXED ANXIETY AND DEPRESSIVE DISORDER: Primary | ICD-10-CM

## 2024-12-20 DIAGNOSIS — E66.811 CLASS 1 OBESITY DUE TO EXCESS CALORIES WITH SERIOUS COMORBIDITY AND BODY MASS INDEX (BMI) OF 33.0 TO 33.9 IN ADULT: ICD-10-CM

## 2024-12-20 DIAGNOSIS — F43.23 ADJUSTMENT DISORDER WITH MIXED ANXIETY AND DEPRESSED MOOD: ICD-10-CM

## 2024-12-20 DIAGNOSIS — I10 ESSENTIAL HYPERTENSION: Primary | ICD-10-CM

## 2024-12-20 DIAGNOSIS — E66.09 CLASS 1 OBESITY DUE TO EXCESS CALORIES WITH SERIOUS COMORBIDITY AND BODY MASS INDEX (BMI) OF 33.0 TO 33.9 IN ADULT: ICD-10-CM

## 2024-12-20 PROCEDURE — 99214 OFFICE O/P EST MOD 30 MIN: CPT | Performed by: INTERNAL MEDICINE

## 2024-12-20 RX ORDER — FLUOXETINE 10 MG/1
10 CAPSULE ORAL DAILY
Qty: 30 CAPSULE | Refills: 2 | Status: SHIPPED | OUTPATIENT
Start: 2024-12-20 | End: 2025-03-20

## 2024-12-20 RX ORDER — HYDROXYZINE HYDROCHLORIDE 25 MG/1
25 TABLET, FILM COATED ORAL 3 TIMES DAILY PRN
Qty: 90 TABLET | Refills: 2 | Status: SHIPPED | OUTPATIENT
Start: 2024-12-20

## 2024-12-20 NOTE — PROGRESS NOTES
Chief Complaint   Patient presents with    Hospital Follow Up Visit     R 12/4/24 to 12/11/2024 Blood pressure and suicidal ideation     Hypertension       Subjective     History of Present Illness   Dg Morse is a 39 y.o. male presenting for follow up after hospitalization.  I reviewed and discussed the details of that call along with the discharge summary, hospital problems, inpatient lab results, inpatient diagnostic studies, and consultation reports with Dg.    Hospitalization Summary:         No data to display              Risk for Readmission (LACE) Score: 9 (12/11/2024  6:00 AM)      Course During Hospital Stay:    History of Present Illness  The patient is a 39-year-old male presenting for a follow-up visit after hospitalization for suicidal ideations.    He was recently hospitalized at the [facility] for a week due to mental health concerns.  Patient was having suicidal ideations after dealing with issues with his family life. He has a scheduled appointment with the behavioral health clinic later today. He is also engaged in therapy sessions. He reports no episodes of dizziness. During his hospital stay, he was prescribed Prozac 10 mg and Atarax for anxiety, which he uses sparingly due to its drowsiness-inducing effect. He has discontinued the use of trazodone as he no longer requires it. He takes over-the-counter melatonin at night.    He acknowledges the need to manage his weight, recognizing it as a significant factor contributing to his blood pressure. He has set a goal to reduce his weight to below 240 pounds, ideally around 230 pounds. His blood pressure has been well-controlled with the addition of amlodipine to his existing hydrochlorothiazide regimen.      The following portions of the patient's history were reviewed and updated as appropriate: allergies, current medications, past family history, past medical history, past social history, past surgical history and problem  list.    Review of Systems   Constitutional:  Negative for chills, fatigue and fever.   HENT:  Negative for congestion, ear pain, rhinorrhea, sinus pressure and sore throat.    Eyes:  Negative for visual disturbance.   Respiratory:  Negative for cough, chest tightness, shortness of breath and wheezing.    Cardiovascular:  Negative for chest pain, palpitations and leg swelling.   Gastrointestinal:  Negative for abdominal pain, blood in stool, constipation, diarrhea, nausea and vomiting.   Endocrine: Negative for polydipsia and polyuria.   Genitourinary:  Negative for dysuria and hematuria.   Musculoskeletal:  Negative for arthralgias and back pain.   Skin:  Negative for rash.   Neurological:  Negative for dizziness, light-headedness, numbness and headaches.   Psychiatric/Behavioral:  Negative for dysphoric mood and sleep disturbance. The patient is not nervous/anxious.        No Known Allergies    Past Medical History:   Diagnosis Date    Depression     Hypertension     Seasonal allergies        Social History     Socioeconomic History    Marital status:    Tobacco Use    Smoking status: Never     Passive exposure: Never    Smokeless tobacco: Never   Vaping Use    Vaping status: Never Used   Substance and Sexual Activity    Alcohol use: Yes     Comment: occasionally    Drug use: Never    Sexual activity: Yes     Partners: Female     Birth control/protection: Surgical       History reviewed. No pertinent surgical history.    Family History   Problem Relation Age of Onset    Hypertension Mother     Parkinsonism Father          Current Outpatient Medications:     amLODIPine (NORVASC) 5 MG tablet, Take 1 tablet by mouth Daily., Disp: 30 tablet, Rfl: 0    FLUoxetine (PROzac) 10 MG capsule, Take 1 capsule by mouth Daily., Disp: 15 capsule, Rfl: 0    hydroCHLOROthiazide 25 MG tablet, Take 1 tablet by mouth Daily., Disp: 90 tablet, Rfl: 2    hydrOXYzine (ATARAX) 25 MG tablet, Take 1 tablet by mouth Every 6 (Six) Hours  "As Needed for Anxiety., Disp: 15 tablet, Rfl: 0    melatonin 5 MG tablet tablet, Take 1 tablet by mouth At Night As Needed., Disp: , Rfl:     multivitamin with minerals tablet tablet, Take 1 tablet by mouth Daily., Disp: , Rfl:     Objective   /71   Pulse 71   Temp 97.9 °F (36.6 °C) (Temporal)   Resp 18   Ht 182.9 cm (72\")   Wt 112 kg (246 lb)   SpO2 98%   BMI 33.36 kg/m²     Physical Exam  Vitals and nursing note reviewed.   Constitutional:       Appearance: Normal appearance. He is well-developed. He is obese.   HENT:      Head: Normocephalic and atraumatic.   Eyes:      Extraocular Movements: Extraocular movements intact.      Conjunctiva/sclera: Conjunctivae normal.   Pulmonary:      Effort: Pulmonary effort is normal.   Musculoskeletal:      Cervical back: Normal range of motion and neck supple.   Skin:     General: Skin is warm and dry.      Findings: No rash.   Neurological:      General: No focal deficit present.      Mental Status: He is alert and oriented to person, place, and time.   Psychiatric:         Mood and Affect: Mood normal.         Behavior: Behavior normal.         Assessment & Plan   Diagnoses and all orders for this visit:    1. Essential hypertension (Primary)    2. Adjustment disorder with mixed anxiety and depressed mood    3. Class 1 obesity due to excess calories with serious comorbidity and body mass index (BMI) of 33.0 to 33.9 in adult        Assessment & Plan   Hypertension.  His blood pressure is currently well-regulated with the combination of amlodipine and hydrochlorothiazide. He is advised to continue his current medication regimen. He is encouraged to engage in regular physical activity, such as jogging, to aid in stress management and weight loss. If significant weight loss is achieved, discontinuation of one of the antihypertensive medications may be considered.    Anxiety - adjustment disorder  He is currently on Prozac 10 mg and Atarax as needed for anxiety. He " reports not using Atarax frequently due to its drowsy effect. He is advised to continue Prozac as prescribed and to use Atarax only when necessary, potentially at a reduced dose of half a tablet. He is also taking over-the-counter melatonin at night.    Obesity  - Weight loss options were discussed in detail including reducing fried, fatty, and sugary foods with diet control. A regular exercise regimen was discussed.  BMI is >= 30 and <35. (Class 1 Obesity). The following options were offered after discussion;: exercise counseling/recommendations and nutrition counseling/recommendations     Transitional Care Management Certification  I certify that the following are true:  1. Within 48 business hours after discharge our office contacted him via telephone to coordinate his care and needs.   2. Complexity of Medical Decision Making is moderate.  3. Face to face visit occurred within 7 days.    *Note: 49995 is for high complexity patients with a face to face visit within 7 days of discharge.  91653 is for high complexity patients with a face to face on days 8-14 post discharge or medium complexity with face to face visit within 14 days post discharge.    Follow up:  Return in about 3 months (around 3/20/2025) for Annual physical.
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
## 2024-12-20 NOTE — PATIENT INSTRUCTIONS
Generalized Anxiety Disorder, Adult  Generalized anxiety disorder (EUGENE) is a mental health condition. Unlike normal worries, anxiety related to EUGENE is not triggered by a specific event. These worries do not fade or get better with time. EUGENE interferes with relationships, work, and school.  EUGENE symptoms can vary from mild to severe. People with severe EUGENE can have intense waves of anxiety with physical symptoms that are similar to panic attacks.  What are the causes?  The exact cause of EUGENE is not known, but the following are believed to have an impact:  Differences in natural brain chemicals.  Genes passed down from parents to children.  Differences in the way threats are perceived.  Development and stress during childhood.  Personality.  What increases the risk?  The following factors may make you more likely to develop this condition:  Being female.  Having a family history of anxiety disorders.  Being very shy.  Experiencing very stressful life events, such as the death of a loved one.  Having a very stressful family environment.  What are the signs or symptoms?  People with EUGENE often worry excessively about many things in their lives, such as their health and family. Symptoms may also include:  Mental and emotional symptoms:  Worrying excessively about natural disasters.  Fear of being late.  Difficulty concentrating.  Fears that others are judging your performance.  Physical symptoms:  Fatigue.  Headaches, muscle tension, muscle twitches, trembling, or feeling shaky.  Feeling like your heart is pounding or beating very fast.  Feeling out of breath or like you cannot take a deep breath.  Having trouble falling asleep or staying asleep, or experiencing restlessness.  Sweating.  Nausea, diarrhea, or irritable bowel syndrome (IBS).  Behavioral symptoms:  Experiencing erratic moods or irritability.  Avoidance of new situations.  Avoidance of people.  Extreme difficulty making decisions.  How is this diagnosed?  This  condition is diagnosed based on your symptoms and medical history. You will also have a physical exam. Your health care provider may perform tests to rule out other possible causes of your symptoms.  To be diagnosed with EUGENE, a person must have anxiety that:  Is out of his or her control.  Affects several different aspects of his or her life, such as work and relationships.  Causes distress that makes him or her unable to take part in normal activities.  Includes at least three symptoms of EUGENE, such as restlessness, fatigue, trouble concentrating, irritability, muscle tension, or sleep problems.  Before your health care provider can confirm a diagnosis of EUGENE, these symptoms must be present more days than they are not, and they must last for 6 months or longer.  How is this treated?  This condition may be treated with:  Medicine. Antidepressant medicine is usually prescribed for long-term daily control. Anti-anxiety medicines may be added in severe cases, especially when panic attacks occur.  Talk therapy (psychotherapy). Certain types of talk therapy can be helpful in treating EUGENE by providing support, education, and guidance. Options include:  Cognitive behavioral therapy (CBT). People learn coping skills and self-calming techniques to ease their physical symptoms. They learn to identify unrealistic thoughts and behaviors and to replace them with more appropriate thoughts and behaviors.  Acceptance and commitment therapy (ACT). This treatment teaches people how to be mindful as a way to cope with unwanted thoughts and feelings.  Biofeedback. This process trains you to manage your body's response (physiological response) through breathing techniques and relaxation methods. You will work with a therapist while machines are used to monitor your physical symptoms.  Stress management techniques. These include yoga, meditation, and exercise.  A mental health specialist can help determine which treatment is best for you.  Some people see improvement with one type of therapy. However, other people require a combination of therapies.  Follow these instructions at home:  Lifestyle  Maintain a consistent routine and schedule.  Anticipate stressful situations. Create a plan and allow extra time to work with your plan.  Practice stress management or self-calming techniques that you have learned from your therapist or your health care provider.  Exercise regularly and spend time outdoors.  Eat a healthy diet that includes plenty of vegetables, fruits, whole grains, low-fat dairy products, and lean protein.  Do not eat a lot of foods that are high in fat, added sugar, or salt (sodium).  Drink plenty of water.  Avoid alcohol. Alcohol can increase anxiety.  Avoid caffeine and certain over-the-counter cold medicines. These may make you feel worse. Ask your pharmacist which medicines to avoid.  General instructions  Take over-the-counter and prescription medicines only as told by your health care provider.  Understand that you are likely to have setbacks. Accept this and be kind to yourself as you persist to take better care of yourself.  Anticipate stressful situations. Create a plan and allow extra time to work with your plan.  Recognize and accept your accomplishments, even if you  them as small.  Spend time with people who care about you.  Keep all follow-up visits. This is important.  Where to find more information  [employer] of Mental Health: www.nimh.nih.gov  Substance Abuse and Mental Health Services: www.samhsa.gov  Contact a health care provider if:  Your symptoms do not get better.  Your symptoms get worse.  You have signs of depression, such as:  A persistently sad or irritable mood.  Loss of enjoyment in activities that used to bring you rich.  Change in weight or eating.  Changes in sleeping habits.  Get help right away if:  You have thoughts about hurting yourself or others.  If you ever feel like you may hurt  yourself or others, or have thoughts about taking your own life, get help right away. Go to your nearest emergency department or:  Call your local emergency services (011 in the [place]  Call a suicide crisis helpline, such as the National Suicide Prevention Lifeline at 1-828.771.3778 or 304 in the [place] This is open 24 hours a day in the [place]  Text the Crisis Text Line at 598465 (in the [place]  Summary  Generalized anxiety disorder (EUGENE) is a mental health condition that involves worry that is not triggered by a specific event.  People with EUGENE often worry excessively about many things in their lives, such as their health and family.  EUGENE may cause symptoms such as restlessness, trouble concentrating, sleep problems, frequent sweating, nausea, diarrhea, headaches, and trembling or muscle twitching.  A mental health specialist can help determine which treatment is best for you. Some people see improvement with one type of therapy. However, other people require a combination of therapies.  This information is not intended to replace advice given to you by your health care provider. Make sure you discuss any questions you have with your health care provider.  Document Revised: 07/13/2022 Document Reviewed: 04/10/2022  Elsevier Patient Education © 2024 [employer]vier Inc.

## 2024-12-26 ENCOUNTER — TELEMEDICINE (OUTPATIENT)
Dept: PSYCHIATRY | Facility: CLINIC | Age: 39
End: 2024-12-26
Payer: COMMERCIAL

## 2024-12-26 DIAGNOSIS — F43.23 SITUATIONAL MIXED ANXIETY AND DEPRESSIVE DISORDER: Primary | ICD-10-CM

## 2024-12-26 NOTE — PROGRESS NOTES
Follow Up Adult Note     Date:2024   Client Name: Dg Morse  : 1985   MRN: [id number]   Time IN: 4:29 pm    Time OUT: 5:09 pm     Mode of Visit: Video   Location of patient: -HOME-   Location of provider: +HOME+   You have chosen to receive care through a telehealth visit.   The patient has signed the video visit consent form.   The visit included audio and video interaction. No technical issues occurred during this visit.     Referring Provider: Tonny Oliveros DO    Chief Complaint:      ICD-10-CM ICD-9-CM   1. Situational mixed anxiety and depressive disorder [F43.23]  F43.23 309.28        History of Present Illness:   Dg Morse is a 39 y.o. male who is being seen today for follow up individual psychotherapy counseling anxiety.  Dg reported a decrease in depression and that anxiety is primary; symptoms include feeling anxious, excessive worrying, overthinking, not knowing what will happen next; he reported that he and his wife continue to work things out, and are now involved in their Hinduism.  CBT was used to assist Kp with processing thoughts/feelings and with exploring/building effective coping strategies.      Objective     Medications:     Current Outpatient Medications:     amLODIPine (NORVASC) 5 MG tablet, Take 1 tablet by mouth Daily., Disp: 30 tablet, Rfl: 0    FLUoxetine (PROzac) 10 MG capsule, Take 1 capsule by mouth Daily for 90 days., Disp: 30 capsule, Rfl: 2    hydroCHLOROthiazide 25 MG tablet, Take 1 tablet by mouth Daily., Disp: 90 tablet, Rfl: 2    hydrOXYzine (ATARAX) 25 MG tablet, Take 1 tablet by mouth 3 (Three) Times a Day As Needed for Anxiety (take as needed for anxiety and insomnia)., Disp: 90 tablet, Rfl: 2    melatonin 5 MG tablet tablet, Take 1 tablet by mouth At Night As Needed., Disp: , Rfl:     multivitamin with minerals tablet tablet, Take 1 tablet by mouth Daily., Disp: , Rfl:     Allergies:   No Known Allergies    Mental Status Exam:   MENTAL  STATUS EXAM   General Appearance:  Cleanly groomed and dressed  Eye Contact:  Good eye contact  Attitude:  Cooperative and polite  Motor Activity:  Normal gait, posture  Muscle Strength:  Normal  Speech:  Normal rate, tone, volume  Language:  Spontaneous  Mood and affect:  Anxious, appropriate and mood congruent  Hopelessness:  Denies  Loneliness: Denies  Thought Process:  Logical  Associations/ Thought Content:  No delusions  Hallucinations:  None  Suicidal Ideations:  Not present  Homicidal Ideation:  Not present  Sensorium:  Alert  Orientation:  Person, place, time and situation  Immediate Recall, Recent, and Remote Memory:  Intact  Attention Span/ Concentration:  Good  Fund of Knowledge:  Appropriate for age and educational level  Intellectual Functioning:  Average range  Insight:  Good  Reliability:  Good  Impulse Control:  Good       SUICIDE RISK ASSESSMENT/CSSRS:  1. Does client have thoughts of suicide? Patient denies  2. Does client have intent for suicide? Patient denies  3. Does client have a current plan for suicide? Patient denies   4. History of suicide attempts: Patient denies   5. Family history of suicide or attempts: Patient denies   6. History of violent behaviors towards others or property or thoughts of committing suicide: Patient denies   7. History of sexual aggression toward others: Patient denies   8. Access to firearms or weapons: Patient denies     Subjective     PHQ-9 Depression Screening  PHQ-9 Total Score: (Patient-Rptd) 0     EUGEEN-7  Feeling nervous, anxious or on edge: (Patient-Rptd) Several days  Not being able to stop or control worrying: (Patient-Rptd) Not at all  Worrying too much about different things: (Patient-Rptd) Not at all  Trouble Relaxing: (Patient-Rptd) Not at all  Being so restless that it is hard to sit still: (Patient-Rptd) Not at all  Feeling afraid as if something awful might happen: (Patient-Rptd) Not at all  Becoming easily annoyed or irritable: (Patient-Rptd) Not  at all  EUGENE 7 Total Score: (Patient-Rptd) 1  If you checked any problems, how difficult have these problems made it for you to do your work, take care of things at home, or get along with other people: (Patient-Rptd) Not difficult at all      Client's Support Network Includes:  wife, children, mother, and Voodoo leader    Functional Status: Moderate impairment     Progress toward goal: At goal    Prognosis: Good with Ongoing Treatment     Assessment / Plan / Progress     Visit Diagnosis/Orders Placed This Visit:    ICD-10-CM ICD-9-CM   1. Situational mixed anxiety and depressive disorder [F43.23]  F43.23 309.28        PLAN:  Safety: No acute safety concerns  Risk Assessment: Risk of self-harm acutely is low. Risk of self-harm chronically is also low, but could be further elevated in the event of treatment noncompliance and/or AODA.    Treatment Plan/Goals & Progress: Continue supportive psychotherapy efforts and medications as indicated. Treatment options discussed during today's visit. Client ackowledged and verbally consented to continue with current treatment plan and was educated on the importance of compliance with treatment and follow-up appointments. Client seems reasonably able to adhere to treatment plan.      Assisted client in processing above session content; acknowledged and normalized client’s thoughts, feelings, and concerns.  Rationalized client's thought process regarding anxiety.      Allowed client to freely discuss issues without interruption or judgement with unconditional positive regard, active listening skills, and empathy. Clinician provided a safe, confidential environment to facilitate the development of a positive therapeutic relationship and encouraged open, honest communication. Assisted client in identifying risk factors which would indicate the need for higher level of care including thoughts to harm self or others and/or self-harming behavior and encouraged client to contact this  office, call 911, or present to the nearest emergency room should any of these events occur. Discussed crisis intervention services and means to access. Client adamantly and convincingly denies current suicidal or homicidal ideation or perceptual disturbance. Assisted client in processing session content; acknowledged and normalized client’s thoughts, feelings, and concerns by utilizing a person-centered approach in efforts to build appropriate rapport and a positive therapeutic relationship with open and honest communication.      Follow Up:   Return in about 1 week (around 1/2/2025) for Video visit.    Kasey Benitez LCSW  [facility]

## 2024-12-31 ENCOUNTER — TELEMEDICINE (OUTPATIENT)
Dept: PSYCHIATRY | Facility: CLINIC | Age: 39
End: 2024-12-31
Payer: COMMERCIAL

## 2024-12-31 DIAGNOSIS — F43.23 SITUATIONAL MIXED ANXIETY AND DEPRESSIVE DISORDER: Primary | ICD-10-CM

## 2024-12-31 PROCEDURE — 90837 PSYTX W PT 60 MINUTES: CPT | Performed by: SOCIAL WORKER

## 2024-12-31 NOTE — PROGRESS NOTES
Follow Up Adult Note     Date:2024   Client Name: Dg Morse  : 1985   MRN: [id number]   Time IN: 4:31 pm    Time OUT: 5:25 pm     Mode of Visit: Video   Location of patient: -HOME-  Location of provider: +HOME+   You have chosen to receive care through a telehealth visit  The patient has signed the video visit consent form.   The visit included audio and video interaction. No technical issues occurred during this visit.     Referring Provider: Tonny Oliveros DO    Chief Complaint:      ICD-10-CM ICD-9-CM   1. Situational mixed anxiety and depressive disorder [F43.23]  F43.23 309.28        History of Present Illness:   Dg Morse is a 39 y.o. male who is being seen today for follow up individual psychotherapy counseling re: anxiety. Dg reported overall things are improving, endorses some anxiety and worry, sleep has improved; has started going to [employer], continues to work on his relationship with his spouse.  CBT was used to assist Dg with processing thoughts/feelings and with exploring/building effective coping strategies.         Objective   Medications:     Current Outpatient Medications:     amLODIPine (NORVASC) 5 MG tablet, Take 1 tablet by mouth Daily., Disp: 30 tablet, Rfl: 0    FLUoxetine (PROzac) 10 MG capsule, Take 1 capsule by mouth Daily for 90 days., Disp: 30 capsule, Rfl: 2    hydroCHLOROthiazide 25 MG tablet, Take 1 tablet by mouth Daily., Disp: 90 tablet, Rfl: 2    hydrOXYzine (ATARAX) 25 MG tablet, Take 1 tablet by mouth 3 (Three) Times a Day As Needed for Anxiety (take as needed for anxiety and insomnia)., Disp: 90 tablet, Rfl: 2    melatonin 5 MG tablet tablet, Take 1 tablet by mouth At Night As Needed., Disp: , Rfl:     multivitamin with minerals tablet tablet, Take 1 tablet by mouth Daily., Disp: , Rfl:     Allergies:   No Known Allergies     Mental Status Exam:   MENTAL STATUS EXAM   General Appearance:  Cleanly groomed and dressed  Eye Contact:  Good eye  contact  Attitude:  Cooperative  Motor Activity:  Normal gait, posture  Muscle Strength:  Normal  Speech:  Normal rate, tone, volume  Language:  Spontaneous  Mood and affect:  Normal, pleasant, appropriate and mood congruent  Hopelessness:  Denies  Loneliness: Denies  Thought Process:  Logical  Associations/ Thought Content:  No delusions  Hallucinations:  None  Suicidal Ideations:  Not present  Homicidal Ideation:  Not present  Sensorium:  Alert and clear  Orientation:  Person, place, time and situation  Immediate Recall, Recent, and Remote Memory:  Intact  Attention Span/ Concentration:  Good  Fund of Knowledge:  Appropriate for age and educational level  Intellectual Functioning:  Average range  Insight:  Good  Judgement:  Good  Reliability:  Good  Impulse Control:  Good     SUICIDE RISK ASSESSMENT/CSSRS:  1. Does client have thoughts of suicide? Patient denies  2. Does client have intent for suicide? Patient denies  3. Does client have a current plan for suicide? Patient denies   4. History of suicide attempts: Patient denies   5. Family history of suicide or attempts: Patient denies   6. History of violent behaviors towards others or property or thoughts of committing suicide: Patient denies   7. History of sexual aggression toward others: Patient denies   8. Access to firearms or weapons: Patient denies     Subjective   PHQ-9 Depression Screening  PHQ-9 Total Score: (Patient-Rptd) 1     EUGENE-7  Feeling nervous, anxious or on edge: (Patient-Rptd) Not at all  Not being able to stop or control worrying: (Patient-Rptd) Not at all  Worrying too much about different things: (Patient-Rptd) Several days  Trouble Relaxing: (Patient-Rptd) Not at all  Being so restless that it is hard to sit still: (Patient-Rptd) Not at all  Feeling afraid as if something awful might happen: (Patient-Rptd) Not at all  Becoming easily annoyed or irritable: (Patient-Rptd) Not at all  EUGENE 7 Total Score: (Patient-Rptd) 1  If you checked any  problems, how difficult have these problems made it for you to do your work, take care of things at home, or get along with other people: (Patient-Rptd) Not difficult at all      Client's Support Network Includes:  wife and family    Functional Status: Mild impairment     Progress toward goal: At goal    Prognosis: Good with Ongoing Treatment     Assessment / Plan / Progress     Visit Diagnosis/Orders Placed This Visit:    ICD-10-CM ICD-9-CM   1. Situational mixed anxiety and depressive disorder [F43.23]  F43.23 309.28      PLAN:  Safety: No acute safety concerns  Risk Assessment: Risk of self-harm acutely is low. Risk of self-harm chronically is also low, but could be further elevated in the event of treatment noncompliance and/or AODA.    Treatment Plan/Goals & Progress: Continue supportive psychotherapy efforts and medications as indicated. Treatment options discussed during today's visit. Client ackowledged and verbally consented to continue with current treatment plan and was educated on the importance of compliance with treatment and follow-up appointments. Client seems reasonably able to adhere to treatment plan.      Assisted client in processing above session content; acknowledged and normalized client’s thoughts, feelings, and concerns.  Rationalized client's thought process regarding anxiety.      Allowed client to freely discuss issues without interruption or judgement with unconditional positive regard, active listening skills, and empathy. Clinician provided a safe, confidential environment to facilitate the development of a positive therapeutic relationship and encouraged open, honest communication. Assisted client in identifying risk factors which would indicate the need for higher level of care including thoughts to harm self or others and/or self-harming behavior and encouraged client to contact this office, call 911, or present to the nearest emergency room should any of these events occur.  Discussed crisis intervention services and means to access. Client adamantly and convincingly denies current suicidal or homicidal ideation or perceptual disturbance. Assisted client in processing session content; acknowledged and normalized client’s thoughts, feelings, and concerns by utilizing a person-centered approach in efforts to build appropriate rapport and a positive therapeutic relationship with open and honest communication.       Follow Up:   Return in about 2 weeks (around 1/14/2025) for Video visit.    Kasey Benitez LCSW  [facility]

## 2025-01-08 RX ORDER — AMLODIPINE BESYLATE 5 MG/1
5 TABLET ORAL
Qty: 90 TABLET | Refills: 0 | Status: SHIPPED | OUTPATIENT
Start: 2025-01-08

## 2025-01-08 NOTE — TELEPHONE ENCOUNTER
Caller: Morse Dg    Relationship: Self    Best call back number: [phone]     Requested Prescriptions:   Requested Prescriptions     Pending Prescriptions Disp Refills    amLODIPine (NORVASC) 5 MG tablet 30 tablet 0     Sig: Take 1 tablet by mouth Daily.        Pharmacy where request should be sent: [id number] [place] [address] [address] AT [facility] [address] - [phone]  - [phone] FX     Last office visit with prescribing clinician: 12/20/2024   Last telemedicine visit with prescribing clinician: Visit date not found   Next office visit with prescribing clinician: 4/4/2025     Additional details provided by patient:     Does the patient have less than a 3 day supply:  [] Yes  [x] No    Would you like a call back once the refill request has been completed: [] Yes [x] No    If the office needs to give you a call back, can they leave a voicemail: [] Yes [x] No    Giuliano Puente Rep   01/08/25 09:09 EST

## 2025-01-14 ENCOUNTER — TELEMEDICINE (OUTPATIENT)
Dept: PSYCHIATRY | Facility: CLINIC | Age: 40
End: 2025-01-14
Payer: COMMERCIAL

## 2025-01-14 DIAGNOSIS — F43.23 SITUATIONAL MIXED ANXIETY AND DEPRESSIVE DISORDER: Primary | ICD-10-CM

## 2025-01-14 NOTE — PLAN OF CARE
Problem: Anxiety       Goal: Reduce/maintain symptoms from/at an average of 2 for anxiety on a 1-10 scale over the next 6 months     Objectives: Dg will learn/use at least 3 effective coping skills including positive communication, decision making, and self-care; to develop and implement behavioral and cognitive strategies to reduce anxiety, over the next 6 months.     Intervention: Clinician will use CBT at least 1x monthly in individual sessions over the next 6 months to assist Dg to explore past emotional issues in relation to present anxiety.     Dg provided verbal consent for this treatment plan due to telehealth.

## 2025-01-14 NOTE — PROGRESS NOTES
Follow Up Adult Note   Date:2025   Client Name: Dg Morse  : 1985   MRN: [id number]   Time IN: 10:59 am    Time OUT: 11:55 am     Referring Provider: Tonny Oliveros DO    Mode of Visit: Video   Location of patient: -WORK-   Location of provider: +HOME+   You have chosen to receive care through a telehealth visit.   The patient has signed the video visit consent form.   The visit included audio and video interaction. No technical issues occurred during this visit.     Chief Complaint:      ICD-10-CM ICD-9-CM   1. Situational mixed anxiety and depressive disorder [F43.23]  F43.23 309.28      History of Present Illness:   Dg Morse is a 39 y.o. male who is being seen today for follow up individual psychotherapy counseling re: anxiety. Dg rated anxiety at a 2 on a 1-10 scale where 1 is minimal and 10 is max; symptoms include feeling anxious, excessive worry, mind racing; improvement with a significant relationship. CBT was used to assist Dg with processing thoughts/feelings and with exploring/building effective coping strategies; a treatment plan was completed this date.    Objective   Medications:     Current Outpatient Medications:     amLODIPine (NORVASC) 5 MG tablet, Take 1 tablet by mouth Daily., Disp: 90 tablet, Rfl: 0    FLUoxetine (PROzac) 10 MG capsule, Take 1 capsule by mouth Daily for 90 days., Disp: 30 capsule, Rfl: 2    hydroCHLOROthiazide 25 MG tablet, Take 1 tablet by mouth Daily., Disp: 90 tablet, Rfl: 2    hydrOXYzine (ATARAX) 25 MG tablet, Take 1 tablet by mouth 3 (Three) Times a Day As Needed for Anxiety (take as needed for anxiety and insomnia)., Disp: 90 tablet, Rfl: 2    melatonin 5 MG tablet tablet, Take 1 tablet by mouth At Night As Needed., Disp: , Rfl:     multivitamin with minerals tablet tablet, Take 1 tablet by mouth Daily., Disp: , Rfl:     Mental Status Exam:   MENTAL STATUS EXAM   General Appearance:  Cleanly groomed and dressed  Eye Contact:  Good eye  contact  Attitude:  Cooperative  Motor Activity:  Normal gait, posture  Muscle Strength:  Normal  Speech:  Normal rate, tone, volume  Language:  Spontaneous  Mood and affect:  Normal, pleasant, appropriate and mood congruent  Hopelessness:  Denies  Loneliness: Denies  Thought Process:  Logical  Associations/ Thought Content:  No delusions  Hallucinations:  None  Suicidal Ideations:  Not present  Homicidal Ideation:  Not present  Sensorium:  Alert  Orientation:  Person, place, time and situation  Immediate Recall, Recent, and Remote Memory:  Intact  Attention Span/ Concentration:  Good  Fund of Knowledge:  Appropriate for age and educational level  Intellectual Functioning:  Average range  Insight:  Good  Judgement:  Good  Reliability:  Good  Impulse Control:  Good     SUICIDE RISK ASSESSMENT/CSSRS:  1. Does client have thoughts of suicide? Patient denies  2. Does client have intent for suicide? Patient denies  3. Does client have a current plan for suicide? Patient denies   4. History of suicide attempts: Patient denies   5. Family history of suicide or attempts: Patient denies   6. History of violent behaviors towards others or property or thoughts of committing suicide: Patient denies   7. History of sexual aggression toward others: Patient denies   8. Access to firearms or weapons: Patient denies   Subjective    PHQ-9 Depression Screening  Little interest or pleasure in doing things? (Patient-Rptd) Not at all   Feeling down, depressed, or hopeless? (Patient-Rptd) Not at all   PHQ-2 Total Score (Patient-Rptd) 0   Trouble falling or staying asleep, or sleeping too much? (Patient-Rptd) Not at all   Feeling tired or having little energy? (Patient-Rptd) Not at all   Poor appetite or overeating? (Patient-Rptd) Not at all   Feeling bad about yourself - or that you are a failure or have let yourself or your family down? (Patient-Rptd) Not at all   Trouble concentrating on things, such as reading the newspaper or watching  television? (Patient-Rptd) Not at all   Moving or speaking so slowly that other people could have noticed? Or the opposite - being so fidgety or restless that you have been moving around a lot more than usual? (Patient-Rptd) Not at all   Thoughts that you would be better off dead, or of hurting yourself in some way? (Patient-Rptd) Not at all   PHQ-9 Total Score (Patient-Rptd) 0   If you checked off any problems, how difficult have these problems made it for you to do your work, take care of things at home, or get along with other people? (Patient-Rptd) Not difficult at all     EUGENE-7  Feeling nervous, anxious or on edge: (Patient-Rptd) Several days  Not being able to stop or control worrying: (Patient-Rptd) Not at all  Worrying too much about different things: (Patient-Rptd) Not at all  Trouble Relaxing: (Patient-Rptd) Not at all  Being so restless that it is hard to sit still: (Patient-Rptd) Not at all  Feeling afraid as if something awful might happen: (Patient-Rptd) Not at all  Becoming easily annoyed or irritable: (Patient-Rptd) Not at all  EUGENE 7 Total Score: (Patient-Rptd) 1  If you checked any problems, how difficult have these problems made it for you to do your work, take care of things at home, or get along with other people: (Patient-Rptd) Not difficult at all    Client's Support Network Includes:  wife  Functional Status: Mild impairment   Progress toward goal: At goal  Prognosis: Good with Ongoing Treatment     Assessment / Plan / Progress   Visit Diagnosis/Orders Placed This Visit:    ICD-10-CM ICD-9-CM   1. Situational mixed anxiety and depressive disorder [F43.23]  F43.23 309.28      PLAN:  Safety: No acute safety concerns  Risk Assessment: Risk of self-harm acutely is low. Risk of self-harm chronically is also low, but could be further elevated in the event of treatment noncompliance and/or AODA.    Treatment Plan/Goals & Progress: Continue supportive psychotherapy efforts and medications as  indicated. Treatment options discussed during today's visit. Client ackowledged and verbally consented to continue with current treatment plan and was educated on the importance of compliance with treatment and follow-up appointments. Client seems reasonably able to adhere to treatment plan.      Assisted client in processing above session content; acknowledged and normalized client’s thoughts, feelings, and concerns.       Allowed client to freely discuss issues without interruption or judgement with unconditional positive regard, active listening skills, and empathy. Clinician provided a safe, confidential environment to facilitate the development of a positive therapeutic relationship and encouraged open, honest communication. Assisted client in identifying risk factors which would indicate the need for higher level of care including thoughts to harm self or others and/or self-harming behavior and encouraged client to contact this office, call 911, or present to the nearest emergency room should any of these events occur. Discussed crisis intervention services and means to access. Client adamantly and convincingly denies current suicidal or homicidal ideation or perceptual disturbance. Assisted client in processing session content; acknowledged and normalized client’s thoughts, feelings, and concerns by utilizing a person-centered approach in efforts to build appropriate rapport and a positive therapeutic relationship with open and honest communication.      Follow Up:   Return in about 17 days (around 1/31/2025).    Kasey Benitez LCSW  [facility]

## 2025-02-11 ENCOUNTER — TELEMEDICINE (OUTPATIENT)
Dept: PSYCHIATRY | Facility: CLINIC | Age: 40
End: 2025-02-11
Payer: COMMERCIAL

## 2025-02-11 DIAGNOSIS — F43.23 SITUATIONAL MIXED ANXIETY AND DEPRESSIVE DISORDER: Primary | ICD-10-CM

## 2025-02-11 NOTE — PROGRESS NOTES
Follow Up Adult Note   Date:2025   Client Name: Dg Morse  : 1985   MRN: [id number]   Time IN: 4:30 pm    Time OUT: 5:23 pm     Mode of Visit: Video  Location of patient: -HOME-  Location of provider: +HOME+  You have chosen to receive care through a telehealth visit.  The patient has signed the video visit consent form.  The visit included audio and video interaction. No technical issues occurred during this visit.    Referring Provider: Tonny Oliveros DO    Chief Complaint:      ICD-10-CM ICD-9-CM   1. Situational mixed anxiety and depressive disorder [F43.23]  F43.23 309.28      History of Present Illness:   Dg Morse is a 39 y.o. male who is being seen today for follow up individual psychotherapy counseling. Dg rated anxiety/depression at a 1 on a 1-10 scale where 1 is minimal and 10 is max; symptoms include feeling anxious, worrying, past regrets; significant relationships are improving. CBT was used to assist Dg with processing thoughts/feelings and with building/reinforcing effective coping strategies.    Objective   Medications:     Current Outpatient Medications:     amLODIPine (NORVASC) 5 MG tablet, Take 1 tablet by mouth Daily., Disp: 90 tablet, Rfl: 0    FLUoxetine (PROzac) 10 MG capsule, Take 1 capsule by mouth Daily for 90 days., Disp: 30 capsule, Rfl: 2    hydroCHLOROthiazide 25 MG tablet, Take 1 tablet by mouth Daily., Disp: 90 tablet, Rfl: 2    hydrOXYzine (ATARAX) 25 MG tablet, Take 1 tablet by mouth 3 (Three) Times a Day As Needed for Anxiety (take as needed for anxiety and insomnia)., Disp: 90 tablet, Rfl: 2    melatonin 5 MG tablet tablet, Take 1 tablet by mouth At Night As Needed., Disp: , Rfl:     multivitamin with minerals tablet tablet, Take 1 tablet by mouth Daily., Disp: , Rfl:     Allergies:   No Known Allergies    Mental Status Exam:   MENTAL STATUS EXAM   General Appearance:  Cleanly groomed and dressed  Eye Contact:  Good eye contact  Attitude:   Cooperative  Motor Activity:  Normal gait, posture  Muscle Strength:  Normal  Speech:  Normal rate, tone, volume  Language:  Spontaneous  Mood and affect:  Normal, pleasant, appropriate and mood congruent  Hopelessness:  Denies  Loneliness: Denies  Thought Process:  Logical  Associations/ Thought Content:  No delusions  Hallucinations:  None  Suicidal Ideations:  Not present  Homicidal Ideation:  Not present  Sensorium:  Alert  Orientation:  Person, place, time and situation  Immediate Recall, Recent, and Remote Memory:  Intact  Attention Span/ Concentration:  Good  Fund of Knowledge:  Appropriate for age and educational level  Intellectual Functioning:  Average range  Insight:  Good  Judgement:  Good  Reliability:  Good  Impulse Control:  Good     Subjective    PHQ-9 Depression Screening  Little interest or pleasure in doing things? (Patient-Rptd) Not at all   Feeling down, depressed, or hopeless? (Patient-Rptd) Not at all   PHQ-2 Total Score (Patient-Rptd) 0   Trouble falling or staying asleep, or sleeping too much? (Patient-Rptd) Not at all   Feeling tired or having little energy? (Patient-Rptd) Not at all   Poor appetite or overeating? (Patient-Rptd) Not at all   Feeling bad about yourself - or that you are a failure or have let yourself or your family down? (Patient-Rptd) Not at all   Trouble concentrating on things, such as reading the newspaper or watching television? (Patient-Rptd) Not at all   Moving or speaking so slowly that other people could have noticed? Or the opposite - being so fidgety or restless that you have been moving around a lot more than usual? (Patient-Rptd) Not at all   Thoughts that you would be better off dead, or of hurting yourself in some way? (Patient-Rptd) Not at all   PHQ-9 Total Score (Patient-Rptd) 0   If you checked off any problems, how difficult have these problems made it for you to do your work, take care of things at home, or get along with other people? (Patient-Rptd) Not  difficult at all     EUGENE-7  Feeling nervous, anxious or on edge: (Patient-Rptd) Not at all  Not being able to stop or control worrying: (Patient-Rptd) Not at all  Worrying too much about different things: (Patient-Rptd) Not at all  Trouble Relaxing: (Patient-Rptd) Not at all  Being so restless that it is hard to sit still: (Patient-Rptd) Not at all  Feeling afraid as if something awful might happen: (Patient-Rptd) Not at all  Becoming easily annoyed or irritable: (Patient-Rptd) Not at all  EUGENE 7 Total Score: (Patient-Rptd) 0  If you checked any problems, how difficult have these problems made it for you to do your work, take care of things at home, or get along with other people: (Patient-Rptd) Not difficult at all    Client's Support Network Includes:  wife, son, and mother  Functional Status: Mild impairment   Progress toward goal: At goal  Prognosis: Good with Ongoing Treatment     Assessment / Plan / Progress   Visit Diagnosis/Orders Placed This Visit:    ICD-10-CM ICD-9-CM   1. Situational mixed anxiety and depressive disorder [F43.23]  F43.23 309.28      PLAN:  Safety: No acute safety concerns  Risk Assessment: Risk of self-harm acutely is low. Risk of self-harm chronically is also low, but could be further elevated in the event of treatment noncompliance and/or AODA.    Treatment Plan/Goals & Progress: Continue supportive psychotherapy efforts and medications as indicated. Treatment options discussed during today's visit. Client ackowledged and verbally consented to continue with current treatment plan and was educated on the importance of compliance with treatment and follow-up appointments. Client seems reasonably able to adhere to treatment plan.      Assisted client in processing above session content; acknowledged and normalized client’s thoughts, feelings, and concerns.     Allowed client to freely discuss issues without interruption or judgement with unconditional positive regard, active listening  skills, and empathy. Clinician provided a safe, confidential environment to facilitate the development of a positive therapeutic relationship and encouraged open, honest communication. Assisted client in identifying risk factors which would indicate the need for higher level of care including thoughts to harm self or others and/or self-harming behavior and encouraged client to contact this office, call 911, or present to the nearest emergency room should any of these events occur. Discussed crisis intervention services and means to access. Client adamantly and convincingly denies current suicidal or homicidal ideation or perceptual disturbance. Assisted client in processing session content; acknowledged and normalized client’s thoughts, feelings, and concerns by utilizing a person-centered approach in efforts to build appropriate rapport and a positive therapeutic relationship with open and honest communication.      Follow Up:   Return in about 2 weeks (around 2/25/2025) for Video visit, therapy session.    Kasey Benitez LCSW  [facility]

## 2025-02-14 ENCOUNTER — TELEMEDICINE (OUTPATIENT)
Dept: PSYCHIATRY | Facility: CLINIC | Age: 40
End: 2025-02-14
Payer: COMMERCIAL

## 2025-02-14 DIAGNOSIS — F43.23 SITUATIONAL MIXED ANXIETY AND DEPRESSIVE DISORDER: Primary | ICD-10-CM

## 2025-02-14 NOTE — PROGRESS NOTES
Video Visit      Patient Name: Dg Morse  : 1985   MRN: [id number]     Referring Provider: Tonny Oliveros DO    Chief Complaint:      ICD-10-CM ICD-9-CM   1. Situational mixed anxiety and depressive disorder [F43.23]  F43.23 309.28        This provider is located at the [facility] (through [facility]), [address], [address], [place] [address] using a secure [employer] Video Visit through [employer]. Patient is being seen remotely via telehealth video visit at their home address in [place], and stated they are in a secure environment for this session. The patient's condition being diagnosed/treated is appropriate for telemedicine. The provider identified herself as well as her credentials. The patient, and/or patients guardian, consent to be seen remotely, and when consent is given they understand that the consent allows for patient identifiable information to be sent to a third party as needed. They may refuse to be seen remotely at any time. The electronic data is encrypted and password protected, and the patient and/or guardian has been advised of the potential risks to privacy not withstanding such measures.    The patient has chosen to receive care today through a telehealth video visit. Do you consent to use a video/audio connection for your medical care today? Yes    History of Present Illness:   Dg Morse is a 39 y.o. male who is being seen by a video visit today for medication and symptom management.  Patient states he was doing much better since seeing this provider last time.  He states that him and his family have been going to [facility] every week and him and his wife are and couples therapy.  Patient also continues in individual therapy with Kasey Benitez in our clinic and states this is going well.  Patient states that his depression is very minimal at this time and that he has increased anxiety at times but feels that this is a  normal emotion for her life stressors.  He states he has been off all of his medication for over a week and is doing well.  He states he ran out of medication and just never refilled it due to wanting to see how he did without it.  He states he is doing well without it.  He states he is working out, drinking a half a gallon of water daily, and trying to eat better.  He states when he does get anxious or overwhelmed he will listen to Jehovah's witness music or deep breathing to get through it and this is beneficial for him.  He states he is sleeping well but when he does struggle to sleep he will take melatonin.  He states he is no longer taking Atarax due to it making him very drowsy.  He states he still has remorse and guilt for what he has put his family through but is working through this and individual counseling and marriage counseling.  He is currently rating his anxiety as a 3 out of a 10 and his depression as a 1 out of a 10 on the Likert scale with 1 being the best and 10 being the worst.  At this time, patient does not wish to continue with medication management and states he wants to continue with therapy and use this, his coping skills, and his ry to continue to improve in life.  Verbalized to patient that this was perfectly fine and that improvement is good but that if he ever had any new or worsening symptoms and needed to see this provider to just call our office and we would get him back in as soon as possible.  Also instructed if he needed anything that his therapist can also reach me any time.  He was instructed with any thoughts of self-harm or suicidal ideation with an intent or plan to go to the emergency room.  He verbalized understanding to this and denies any recent or current SI/HI/AVH.  He will follow up with this provider as needed.    Subjective      Review of Systems:   Review of Systems   Psychiatric/Behavioral:  Positive for stress. The patient is nervous/anxious.        Screening Scores:    PHQ-9 Total Score: (Patient-Rptd) 0  EUGENE-7  Feeling nervous, anxious or on edge: (Patient-Rptd) Not at all  Not being able to stop or control worrying: (Patient-Rptd) Not at all  Worrying too much about different things: (Patient-Rptd) Not at all  Trouble Relaxing: (Patient-Rptd) Not at all  Being so restless that it is hard to sit still: (Patient-Rptd) Not at all  Feeling afraid as if something awful might happen: (Patient-Rptd) Not at all  Becoming easily annoyed or irritable: (Patient-Rptd) Not at all  EUGENE 7 Total Score: (Patient-Rptd) 0  If you checked any problems, how difficult have these problems made it for you to do your work, take care of things at home, or get along with other people: (Patient-Rptd) Not difficult at all      RISK ASSESSMENT:  Patient denies any thoughts of suicide or intent today. Patient denies any suicidal or homicidal ideation today. Patient denies any high risk factors today.     Medications:     Current Outpatient Medications:     amLODIPine (NORVASC) 5 MG tablet, Take 1 tablet by mouth Daily., Disp: 90 tablet, Rfl: 0    hydroCHLOROthiazide 25 MG tablet, Take 1 tablet by mouth Daily., Disp: 90 tablet, Rfl: 2    melatonin 5 MG tablet tablet, Take 1 tablet by mouth At Night As Needed., Disp: , Rfl:     multivitamin with minerals tablet tablet, Take 1 tablet by mouth Daily., Disp: , Rfl:     Medication Considerations:  KELLY reviewed and appropriate.      Allergies:   No Known Allergies    Objective     Physical Exam:  Vital Signs: There were no vitals filed for this visit.  There is no height or weight on file to calculate BMI.     Mental Status Exam:   MENTAL STATUS EXAM   General Appearance:  Cleanly groomed and dressed  Eye Contact:  Good eye contact  Attitude:  Cooperative  Speech:  Normal rate, tone, volume  Language:  Spontaneous  Mood and affect:  Normal, pleasant  Hopelessness:  Denies  Loneliness: Denies  Thought Process:  Logical  Associations/ Thought Content:  No  delusions  Hallucinations:  None  Suicidal Ideations:  Not present  Homicidal Ideation:  Not present  Sensorium:  Alert  Orientation:  Person and time  Immediate Recall, Recent, and Remote Memory:  Intact  Attention Span/ Concentration:  Good  Fund of Knowledge:  Appropriate for age and educational level  Intellectual Functioning:  Average range  Insight:  Good  Judgement:  Good  Reliability:  Good  Impulse Control:  Fair         Assessment / Plan      Visit Diagnosis/Orders Placed This Visit:  Diagnoses and all orders for this visit:    1. Situational mixed anxiety and depressive disorder [F43.23] (Primary)         Functional Status: Mild impairment     Prognosis: Good with Ongoing Treatment     Impression/Formulation:  Patient appeared alert and oriented.  Patient is voluntarily requesting to begin outpatient therapy at [facility].  Patient is receptive to assistance with maintaining a stable lifestyle.  Patient presents with history of     ICD-10-CM ICD-9-CM   1. Situational mixed anxiety and depressive disorder [F43.23]  F43.23 309.28   .     Treatment Plan:   -Discontinue Prozac  -Discontinue Atarax  -Continue with psychotherapy  -Follow-up with this provider as needed      The KELLY report, reviewed through PDMP, of the past 12 months were reviewed and is appropriate.  The patient/guardian reports taking the medication only as prescribed.  The patient/guardian denies any abuse or misuse of the medication.  The patient/guardian denies any other substance use or issues.  There are no apparent substance related issues.  The patient reports no side effects of the current medication usage.  The patient/guardian has reported significant improvement with medication usage and wishes to continue medication as prescribed.  The patient/guardian is appropriate to continue with current medication usage at this time.  Reinforced risks and side effects of medication usage, patient and/or guardian  verbalize understanding in their own words and are in agreement with current plan.    Patient will continue supportive psychotherapy efforts and medications as indicated. Clinic will obtain release of information for current treatment team for continuity of care as needed. Patient will contact this office, call 911 or present to the nearest emergency room should suicidal or homicidal ideations occur. Discussed medication options and treatment plan of prescribed medication(s) as well as the risks, benefits, and potential side effects. Patient ackowledged and verbally consented to continue with current treatment plan and was educated on the importance of compliance with treatment and follow-up appointments.     Follow Up:   Return if symptoms worsen or fail to improve.      KRIS Jacobsen  [facility]     This is electronically signed by KRIS Jacobsen  02 /14/2025 13:12 EST    Mode of Visit: Video   Location of patient: -HOME-   Location of provider: +[facility]+   You have chosen to receive care through a telehealth visit.   The patient has signed the video visit consent form.   The visit included audio and video interaction. No technical issues occurred during this visit.     Part of this note may be an electronic transcription/translation of spoken language to printed text using the Dragon Dictation System.

## 2025-02-25 ENCOUNTER — TELEMEDICINE (OUTPATIENT)
Dept: PSYCHIATRY | Facility: CLINIC | Age: 40
End: 2025-02-25
Payer: COMMERCIAL

## 2025-02-25 DIAGNOSIS — F43.23 SITUATIONAL MIXED ANXIETY AND DEPRESSIVE DISORDER: Primary | ICD-10-CM

## 2025-02-25 NOTE — PROGRESS NOTES
Initial Adult Note   Date:2025   Client Name: Dg Morse  : 1985   MRN: [id number]   Time IN: ***    Time OUT: ***     Referring Provider: Tonny Oliveros DO    Chief Complaint:    No diagnosis found.     History of Present Illness:   Dg Morse is a 39 y.o. male who is being seen today for an initial psychotherapy counseling assessment for ***    Past Psychiatric History:   ***  Objective   PHQ-9 Depression Screening   Little interest or pleasure in doing things? (Patient-Rptd) Not at all   Feeling down, depressed, or hopeless? (Patient-Rptd) Not at all   PHQ-2 Total Score (Patient-Rptd) 0   Trouble falling or staying asleep, or sleeping too much? (Patient-Rptd) Not at all   Feeling tired or having little energy? (Patient-Rptd) Not at all   Poor appetite or overeating? (Patient-Rptd) Not at all   Feeling bad about yourself - or that you are a failure or have let yourself or your family down? (Patient-Rptd) Not at all   Trouble concentrating on things, such as reading the newspaper or watching television? (Patient-Rptd) Not at all   Moving or speaking so slowly that other people could have noticed? Or the opposite - being so fidgety or restless that you have been moving around a lot more than usual? (Patient-Rptd) Not at all   Thoughts that you would be better off dead, or of hurting yourself in some way? (Patient-Rptd) Not at all   PHQ-9 Total Score (Patient-Rptd) 0   If you checked off any problems, how difficult have these problems made it for you to do your work, take care of things at home, or get along with other people? (Patient-Rptd) Not difficult at all     EUGENE-7 Score: Feeling nervous, anxious or on edge: (Patient-Rptd) Not at all  Not being able to stop or control worrying: (Patient-Rptd) Not at all  Worrying too much about different things: (Patient-Rptd) Not at all  Trouble Relaxing: (Patient-Rptd) Not at all  Being so restless that it is hard to sit still: (Patient-Rptd) Not at  all  Feeling afraid as if something awful might happen: (Patient-Rptd) Not at all  Becoming easily annoyed or irritable: (Patient-Rptd) Not at all  EUGENE 7 Total Score: (Patient-Rptd) 0  If you checked any problems, how difficult have these problems made it for you to do your work, take care of things at home, or get along with other people: (Patient-Rptd) Not difficult at all    Interpersonal/Relational:  Marital Status: {MARITAL STATUS:51025}  Support system: {support system:22878}    Work History:   Highest level of education obtained: {MISC; EDUCATION LEVELS:36728}  Client's occupation: ***  Ever been active duty in the ? {Yes or No:36579}    Mental/Behavioral Health History:  Past diagnoses: ***  History or Active MH treatment: ***  Are there any significant health issues (current or past): ***  History of seizures: {Yes or No:69158}    Family Psychiatric History:  ***    History of Substance Use:  Client History:  ***  Family History: ***     Lifestyle:  Current hobbies include:***  Strengths/Current Coping Strategies: ***    Significant Life Events:   Verbal, physical, sexual abuse? {Yes or No:33057}  Has client experienced a death / loss of relationship? {Yes or No:80343}  Has client experienced a major accident or tragic events? {Yes or No:57564}    Triggers: (Persons/Places/Things/Events/Thought/Emotions): ***    Legal History:  { Legal Violence HX:49466}    Social History:   Social History     Socioeconomic History    Marital status:    Tobacco Use    Smoking status: Never     Passive exposure: Never    Smokeless tobacco: Never   Vaping Use    Vaping status: Never Used   Substance and Sexual Activity    Alcohol use: Not Currently     Comment: occasionally    Drug use: Never    Sexual activity: Yes     Partners: Female     Birth control/protection: None, Surgical      Past Medical History:   Past Medical History:   Diagnosis Date    Anxiety     Depression     Hypertension     Seasonal  allergies      Past Surgical History: No past surgical history on file.  Family History:   Family History   Problem Relation Age of Onset    Hypertension Mother     Parkinsonism Father      Medications:     Current Outpatient Medications:     amLODIPine (NORVASC) 5 MG tablet, Take 1 tablet by mouth Daily., Disp: 90 tablet, Rfl: 0    hydroCHLOROthiazide 25 MG tablet, Take 1 tablet by mouth Daily., Disp: 90 tablet, Rfl: 2    melatonin 5 MG tablet tablet, Take 1 tablet by mouth At Night As Needed., Disp: , Rfl:     multivitamin with minerals tablet tablet, Take 1 tablet by mouth Daily., Disp: , Rfl:   Allergies:   No Known Allergies  Subjective   Mental Status Exam:   MENTAL STATUS EXAM  Assessment / Plan    Visit Diagnosis/Orders Placed This Visit:  No diagnosis found.     SUICIDE RISK ASSESSMENT/CSSRS:  1. Does client have thoughts of suicide? {yes or patient denies:05490}   2. Does client have intent for suicide? {yes or patient denies:92720}   3. Does client have a current plan for suicide? {yes or patient denies:38618}   4. History of suicide attempts: {yes or patient denies:72184}   5. Family history of suicide or attempts: {yes or patient denies:66122}   6. History of violent behaviors towards others or property or thoughts of committing suicide: {yes or patient denies:03709}   7. History of sexual aggression toward others: {yes or patient denies:77031}   8. Access to firearms or weapons: {yes or patient denies:47612}     PLAN:  Safety: {Safety :52290}  Risk Assessment: Risk of self-harm acutely is low. Risk of self-harm chronically is also low, but could be further elevated in the event of treatment noncompliance and/or AODA.    Treatment Plan/ Short and Long Term Goals: Continue supportive psychotherapy efforts and medications as indicated. Treatment options discussed during today's visit. Client acknowledged and verbally consented to continue with current treatment plan and was educated on the importance of  compliance with treatment and follow-up appointments. Client seems reasonably able to adhere to treatment plan.      Assisted client in processing above session content; acknowledged and normalized client’s thoughts, feelings, and concerns.     Allowed client to freely discuss issues without interruption or judgement with unconditional positive regard, active listening skills, and empathy. Clinician provided a safe, confidential environment to facilitate the development of a positive therapeutic relationship and encouraged open, honest communication. Assisted client in identifying risk factors which would indicate the need for higher level of care including thoughts to harm self or others and/or self-harming behavior and encouraged client to contact this office, call 911, or present to the nearest emergency room should any of these events occur. Discussed crisis intervention services and means to access. Client adamantly and convincingly denies current suicidal or homicidal ideation or perceptual disturbance. Assisted client in processing session content; acknowledged and normalized client’s thoughts, feelings, and concerns by utilizing a person-centered approach in efforts to build appropriate rapport and a positive therapeutic relationship with open and honest communication.     Quality Measures:   TOBACCO USE:  Tobacco Use: Low Risk  (2/14/2025)    Patient History     Smoking Tobacco Use: Never     Smokeless Tobacco Use: Never     Passive Exposure: Never      {[facility]_Smoking_Cessation:00934}    I advised Dg of the risks of tobacco use.     Follow Up:   No follow-ups on file.    Kasey Benitez LCSW  [facility]

## 2025-02-26 NOTE — PROGRESS NOTES
Follow Up Adult Note   Date:2025   Client Name: Dg Morse  : 1985   MRN: [id number]   Time IN: 4:29 pm    Time OUT: 5:00 pm     Mode of Visit: Video  Location of patient: -WORK-  Location of provider: +HOME+  You have chosen to receive care through a telehealth visit.  The patient has signed the video visit consent form.  The visit included audio and video interaction. No technical issues occurred during this visit.    Referring Provider: Tonny Oliveros DO    Chief Complaint:      ICD-10-CM ICD-9-CM   1. Situational mixed anxiety and depressive disorder [F43.23]  F43.23 309.28      History of Present Illness:   Dg Morse is a 39 y.o. male who is being seen today for follow up individual psychotherapy counseling. Dg reported he is doing well, he and his wife are attending marriage counseling, attending Religious as a family, and is no longer on medication, continues to use coping skills for anxiety; discussed one stressor. CBT was used to assist Dg with processing thoughts/feelings and with reviewing/reinforcing positive coping strategies.       Objective   Medications:     Current Outpatient Medications:     amLODIPine (NORVASC) 5 MG tablet, Take 1 tablet by mouth Daily., Disp: 90 tablet, Rfl: 0    hydroCHLOROthiazide 25 MG tablet, Take 1 tablet by mouth Daily., Disp: 90 tablet, Rfl: 2    melatonin 5 MG tablet tablet, Take 1 tablet by mouth At Night As Needed., Disp: , Rfl:     multivitamin with minerals tablet tablet, Take 1 tablet by mouth Daily., Disp: , Rfl:   Allergies:   No Known Allergies  Mental Status Exam:   MENTAL STATUS EXAM   General Appearance:  Cleanly groomed and dressed  Eye Contact:  Good eye contact  Attitude:  Cooperative  Motor Activity:  Normal gait, posture  Muscle Strength:  Normal  Speech:  Normal rate, tone, volume  Language:  Spontaneous  Mood and affect:  Normal, pleasant, mood congruent and appropriate  Hopelessness:  Denies  Loneliness: Denies  Thought  Process:  Logical  Associations/ Thought Content:  No delusions  Hallucinations:  None  Suicidal Ideations:  Not present  Homicidal Ideation:  Not present  Sensorium:  Alert  Orientation:  Person, place, time and situation  Immediate Recall, Recent, and Remote Memory:  Intact  Attention Span/ Concentration:  Good  Fund of Knowledge:  Appropriate for age and educational level  Intellectual Functioning:  Average range  Insight:  Good  Judgement:  Good  Reliability:  Good  Impulse Control:  Good     Subjective    PHQ-9 Depression Screening  Little interest or pleasure in doing things? (Patient-Rptd) Not at all   Feeling down, depressed, or hopeless? (Patient-Rptd) Not at all   PHQ-2 Total Score (Patient-Rptd) 0   Trouble falling or staying asleep, or sleeping too much? (Patient-Rptd) Not at all   Feeling tired or having little energy? (Patient-Rptd) Not at all   Poor appetite or overeating? (Patient-Rptd) Not at all   Feeling bad about yourself - or that you are a failure or have let yourself or your family down? (Patient-Rptd) Not at all   Trouble concentrating on things, such as reading the newspaper or watching television? (Patient-Rptd) Not at all   Moving or speaking so slowly that other people could have noticed? Or the opposite - being so fidgety or restless that you have been moving around a lot more than usual? (Patient-Rptd) Not at all   Thoughts that you would be better off dead, or of hurting yourself in some way? (Patient-Rptd) Not at all   PHQ-9 Total Score (Patient-Rptd) 0   If you checked off any problems, how difficult have these problems made it for you to do your work, take care of things at home, or get along with other people? (Patient-Rptd) Not difficult at all     EUGENE-7  Feeling nervous, anxious or on edge: (Patient-Rptd) Not at all  Not being able to stop or control worrying: (Patient-Rptd) Not at all  Worrying too much about different things: (Patient-Rptd) Not at all  Trouble Relaxing:  (Patient-Rptd) Not at all  Being so restless that it is hard to sit still: (Patient-Rptd) Not at all  Feeling afraid as if something awful might happen: (Patient-Rptd) Not at all  Becoming easily annoyed or irritable: (Patient-Rptd) Not at all  EUGENE 7 Total Score: (Patient-Rptd) 0  If you checked any problems, how difficult have these problems made it for you to do your work, take care of things at home, or get along with other people: (Patient-Rptd) Not difficult at all    Client's Support Network Includes:  wife  Functional Status: Mild impairment   Progress toward goal: At goal  Prognosis: Good with Ongoing Treatment     Assessment / Plan / Progress   Visit Diagnosis/Orders Placed This Visit:    ICD-10-CM ICD-9-CM   1. Situational mixed anxiety and depressive disorder [F43.23]  F43.23 309.28      PLAN:  Safety: No acute safety concerns  Risk Assessment: Risk of self-harm acutely is low. Risk of self-harm chronically is also low, but could be further elevated in the event of treatment noncompliance and/or AODA.    Treatment Plan/Goals & Progress: Continue supportive psychotherapy efforts and medications as indicated. Treatment options discussed during today's visit. Client ackowledged and verbally consented to continue with current treatment plan and was educated on the importance of compliance with treatment and follow-up appointments. Client seems reasonably able to adhere to treatment plan.      Assisted client in processing above session content; acknowledged and normalized client’s thoughts, feelings, and concerns.     Allowed client to freely discuss issues without interruption or judgement with unconditional positive regard, active listening skills, and empathy. Clinician provided a safe, confidential environment to facilitate the development of a positive therapeutic relationship and encouraged open, honest communication. Assisted client in identifying risk factors which would indicate the need for higher  level of care including thoughts to harm self or others and/or self-harming behavior and encouraged client to contact this office, call 911, or present to the nearest emergency room should any of these events occur. Discussed crisis intervention services and means to access. Client adamantly and convincingly denies current suicidal or homicidal ideation or perceptual disturbance. Assisted client in processing session content; acknowledged and normalized client’s thoughts, feelings, and concerns by utilizing a person-centered approach in efforts to build appropriate rapport and a positive therapeutic relationship with open and honest communication.      Follow Up:   Return in about 31 days (around 3/28/2025) for Video visit, therapy session.    Kasey Benitez LCSW  [facility] [place]

## 2025-03-07 ENCOUNTER — TELEMEDICINE (OUTPATIENT)
Dept: PSYCHIATRY | Facility: CLINIC | Age: 40
End: 2025-03-07
Payer: COMMERCIAL

## 2025-03-07 DIAGNOSIS — F43.23 SITUATIONAL MIXED ANXIETY AND DEPRESSIVE DISORDER: Primary | ICD-10-CM

## 2025-03-07 NOTE — PROGRESS NOTES
"   Follow Up Adult Note   Date:2025   Client Name: Dg Morse  : 1985   MRN: [id number]   Time IN: 1:42 pm    Time OUT: 2:36 pm     Mode of Visit: Video  Location of patient: -HOME-  Location of provider: +HOME+  You have chosen to receive care through a telehealth visit.  The patient has signed the video visit consent form.  The visit included audio and video interaction. No technical issues occurred during this visit.    Referring Provider: Tonny Oliveros DO    Chief Complaint:      ICD-10-CM ICD-9-CM   1. Situational mixed anxiety and depressive disorder [F43.23]  F43.23 309.28      History of Present Illness:   Dg Morse is a 39 y.o. male who is being seen today for follow up individual psychotherapy counseling. Dg reported an increase in anxiety, \"There's been a lot of changes\"; symptoms include feeling anxious, excessive worrying, over thinking, feeling overwhelmed; denied any SI. CBT was used to assist Dg with processing thoughts/feelings and with building/reinforcing effective coping strategies.      Objective     Mental Status Exam:   MENTAL STATUS EXAM   General Appearance:  Cleanly groomed and dressed  Eye Contact:  Good eye contact  Attitude:  Cooperative  Motor Activity:  Normal gait, posture  Muscle Strength:  Normal  Speech:  Normal rate, tone, volume  Language:  Spontaneous  Mood and affect:  Appropriate, mood congruent and anxious  Hopelessness:  Denies  Loneliness: Denies  Thought Process:  Logical  Associations/ Thought Content:  No delusions  Hallucinations:  None  Suicidal Ideations:  Not present  Homicidal Ideation:  Not present  Sensorium:  Alert  Orientation:  Person, place, time and situation  Immediate Recall, Recent, and Remote Memory:  Intact  Attention Span/ Concentration:  Good  Fund of Knowledge:  Appropriate for age and educational level  Intellectual Functioning:  Average range  Insight:  Good  Judgement:  Good  Reliability:  Good  Impulse Control:  " Good     Subjective    PHQ-9 Depression Screening  Little interest or pleasure in doing things? (Patient-Rptd) Not at all   Feeling down, depressed, or hopeless? (Patient-Rptd) Not at all   PHQ-2 Total Score (Patient-Rptd) 0   Trouble falling or staying asleep, or sleeping too much? (Patient-Rptd) Not at all   Feeling tired or having little energy? (Patient-Rptd) Not at all   Poor appetite or overeating? (Patient-Rptd) Not at all   Feeling bad about yourself - or that you are a failure or have let yourself or your family down? (Patient-Rptd) Several days   Trouble concentrating on things, such as reading the newspaper or watching television? (Patient-Rptd) Not at all   Moving or speaking so slowly that other people could have noticed? Or the opposite - being so fidgety or restless that you have been moving around a lot more than usual? (Patient-Rptd) Not at all   Thoughts that you would be better off dead, or of hurting yourself in some way? (Patient-Rptd) Not at all   PHQ-9 Total Score (Patient-Rptd) 1   If you checked off any problems, how difficult have these problems made it for you to do your work, take care of things at home, or get along with other people? (Patient-Rptd) Not difficult at all     EUGENE-7  Feeling nervous, anxious or on edge: (Patient-Rptd) Several days  Not being able to stop or control worrying: (Patient-Rptd) Several days  Worrying too much about different things: (Patient-Rptd) Several days  Trouble Relaxing: (Patient-Rptd) Not at all  Being so restless that it is hard to sit still: (Patient-Rptd) Not at all  Feeling afraid as if something awful might happen: (Patient-Rptd) Several days  Becoming easily annoyed or irritable: (Patient-Rptd) Not at all  EUGENE 7 Total Score: (Patient-Rptd) 4  If you checked any problems, how difficult have these problems made it for you to do your work, take care of things at home, or get along with other people: (Patient-Rptd) Somewhat difficult    Client's  Support Network Includes:  wife, mother, and 2 friends  Functional Status: Moderate impairment   Progress toward goal: At goal  Prognosis: Good with Ongoing Treatment     Assessment / Plan / Progress   Visit Diagnosis/Orders Placed This Visit:    ICD-10-CM ICD-9-CM   1. Situational mixed anxiety and depressive disorder [F43.23]  F43.23 309.28      PLAN:  Safety: No acute safety concerns  Risk Assessment: Risk of self-harm acutely is low. Risk of self-harm chronically is also low, but could be further elevated in the event of treatment noncompliance and/or AODA.    Treatment Plan/Goals & Progress: Continue supportive psychotherapy efforts and medications as indicated. Treatment options discussed during today's visit. Client ackowledged and verbally consented to continue with current treatment plan and was educated on the importance of compliance with treatment and follow-up appointments. Client seems reasonably able to adhere to treatment plan.      Assisted client in processing above session content; acknowledged and normalized client’s thoughts, feelings, and concerns.     Allowed client to freely discuss issues without interruption or judgement with unconditional positive regard, active listening skills, and empathy. Clinician provided a safe, confidential environment to facilitate the development of a positive therapeutic relationship and encouraged open, honest communication. Assisted client in identifying risk factors which would indicate the need for higher level of care including thoughts to harm self or others and/or self-harming behavior and encouraged client to contact this office, call 911, or present to the nearest emergency room should any of these events occur. Discussed crisis intervention services and means to access. Client adamantly and convincingly denies current suicidal or homicidal ideation or perceptual disturbance. Assisted client in processing session content; acknowledged and normalized  client’s thoughts, feelings, and concerns by utilizing a person-centered approach in efforts to build appropriate rapport and a positive therapeutic relationship with open and honest communication.      Follow Up:   Return in about 3 weeks (around 3/28/2025) for therapy session.    Kasey Benitez LCSW  [facility]

## 2025-03-10 ENCOUNTER — TELEPHONE (OUTPATIENT)
Dept: PSYCHIATRY | Facility: CLINIC | Age: 40
End: 2025-03-10
Payer: COMMERCIAL

## 2025-03-10 DIAGNOSIS — G47.00 INSOMNIA, UNSPECIFIED TYPE: Primary | ICD-10-CM

## 2025-03-10 NOTE — TELEPHONE ENCOUNTER
Pt called and stated that he has not been able to sleep  the past few days due to his increased anxiety. Would like to see if he could get something to assist with sleep and discuss further at his next appointment. Please advise.

## 2025-03-11 RX ORDER — TRAZODONE HYDROCHLORIDE 50 MG/1
50 TABLET ORAL NIGHTLY
Qty: 30 TABLET | Refills: 0 | Status: SHIPPED | OUTPATIENT
Start: 2025-03-11

## 2025-03-11 NOTE — TELEPHONE ENCOUNTER
Called and left detailed message with pt spouse. Pt phone disconnected. Instructed to call back and let us know what he would like to do.

## 2025-03-13 ENCOUNTER — TELEMEDICINE (OUTPATIENT)
Dept: PSYCHIATRY | Facility: CLINIC | Age: 40
End: 2025-03-13
Payer: COMMERCIAL

## 2025-03-13 DIAGNOSIS — F43.23 SITUATIONAL MIXED ANXIETY AND DEPRESSIVE DISORDER: Primary | ICD-10-CM

## 2025-03-13 DIAGNOSIS — G47.00 INSOMNIA, UNSPECIFIED TYPE: ICD-10-CM

## 2025-03-13 RX ORDER — HYDROXYZINE HYDROCHLORIDE 25 MG/1
25 TABLET, FILM COATED ORAL 3 TIMES DAILY PRN
Qty: 90 TABLET | Refills: 1 | Status: SHIPPED | OUTPATIENT
Start: 2025-03-13

## 2025-03-13 NOTE — PROGRESS NOTES
Video Visit      Patient Name: Dg Morse  : 1985   MRN: [id number]     Referring Provider: Tonny Oliveros DO    Chief Complaint:      ICD-10-CM ICD-9-CM   1. Situational mixed anxiety and depressive disorder [F43.23]  F43.23 309.28   2. Insomnia, unspecified type  G47.00 780.52          This provider is located at the [facility] (through [facility]), [address], [address], [address], [place] [id number] using a secure [employer] Video Visit through [employer]. Patient is being seen remotely via telehealth video visit at their home address in [place], and stated they are in a secure environment for this session. The patient's condition being diagnosed/treated is appropriate for telemedicine. The provider identified herself as well as her credentials. The patient, and/or patients guardian, consent to be seen remotely, and when consent is given they understand that the consent allows for patient identifiable information to be sent to a third party as needed. They may refuse to be seen remotely at any time. The electronic data is encrypted and password protected, and the patient and/or guardian has been advised of the potential risks to privacy not withstanding such measures.    The patient has chosen to receive care today through a telehealth video visit. Do you consent to use a video/audio connection for your medical care today? Yes    History of Present Illness:   Dg Morse is a 39 y.o. male who is being seen by a video visit today for medication and symptom management.  Patient had called into the office a few days ago and stated that he was not able to sleep.  He was requesting something to help him sleep.  At that time he was started on trazodone 50 mg at night to assist him with this.  On today's visit, he stated that the trazodone helped him sleep last night and he actually felt better today.  When questioned why he was not sleeping, patient stated  some other issues had come up where he had had an affair and the other woman is causing a lot of trouble and stress for him and his family.  Patient states that he is not eating and sleeping and just does not know what to do.  We discussed a lot about on today's visit not being able to control what others do but being able to control how we handled the situation.  We discussed him needing to be there for his wife and his children and setting a good example for them and to not let someone have such an impact on him and his family that are trying to cause her pain.  Patient continues to have a lot of remorse and guilt for what he has done and the pain that he has put his family through.  Encouraged continued participation in counseling and marriage counseling as well as putting a lot of ry in the higher power he believes in.  Suggested a lot of communication with his wife and to spend as much time with his family as possible.  Also discussed the legal options such as getting a EPO on the third party who seems to be stalking patient.  He does deny having any thoughts of self-harm or suicidal ideations with an intent or plan.  However, he states he just feels helpless at times and does not know how to get out of the situation.  Instructed patient that we will have consequences to our actions and he just has to take it a day at a time and live the best life that he possibly can from here on out.  He does endorse having increased anxiety and depression and states he just feels really down.  He had stopped taking his Prozac but is currently requesting to have it back.  Patient will be prescribed his Prozac 20 mg daily again as well as Atarax 25 mg 3 times a day as needed for anxiety and insomnia.  He will continue on his trazodone 50 mg at night as needed.  Instructed patient not to take his Atarax, melatonin, and trazodone together.  Patient had to cancel his therapy appointment tomorrow due to having a dental  procedure but does have another appointment on the 28th.  He is highly encouraged to participate in this and to use his coping skills during this difficult time.  He was instructed on the mechanism of action and side effects of all his medications again and when to seek medical treatment.  He was instructed with any new or worsening symptoms to notify this provider.  He was instructed with any thoughts of self-harm or suicidal ideation with intent or plan to go directly to the emergency room.  He was instructed on adequate nutrition and hydration as well as adapting to a good exercise regimen to help improve his overall mental physical wellbeing.  He verbalized understanding to all.  He denies any current SI/HI/AVH.  Subjective      Review of Systems:   Review of Systems   Constitutional:  Positive for fatigue.   Psychiatric/Behavioral:  Positive for sleep disturbance, depressed mood and stress. The patient is nervous/anxious.        Screening Scores:   PHQ-9 Total Score: (Patient-Rptd) 9  EUGENE-7  Feeling nervous, anxious or on edge: (Patient-Rptd) Several days  Not being able to stop or control worrying: (Patient-Rptd) Several days  Worrying too much about different things: (Patient-Rptd) Several days  Trouble Relaxing: (Patient-Rptd) Several days  Being so restless that it is hard to sit still: (Patient-Rptd) Several days  Feeling afraid as if something awful might happen: (Patient-Rptd) Several days  Becoming easily annoyed or irritable: (Patient-Rptd) Not at all  EUGENE 7 Total Score: (Patient-Rptd) 6  If you checked any problems, how difficult have these problems made it for you to do your work, take care of things at home, or get along with other people: (Patient-Rptd) Somewhat difficult      RISK ASSESSMENT:  Patient denies any thoughts of suicide or intent today. Patient denies any suicidal or homicidal ideation today. Patient denies any high risk factors today.     Medications:     Current Outpatient  Medications:     amLODIPine (NORVASC) 5 MG tablet, Take 1 tablet by mouth Daily., Disp: 90 tablet, Rfl: 0    FLUoxetine (PROzac) 20 MG capsule, Take 1 capsule by mouth Daily for 90 days., Disp: 30 capsule, Rfl: 2    hydroCHLOROthiazide 25 MG tablet, Take 1 tablet by mouth Daily., Disp: 90 tablet, Rfl: 2    hydrOXYzine (ATARAX) 25 MG tablet, Take 1 tablet by mouth 3 (Three) Times a Day As Needed for Anxiety., Disp: 90 tablet, Rfl: 1    melatonin 5 MG tablet tablet, Take 1 tablet by mouth At Night As Needed., Disp: , Rfl:     multivitamin with minerals tablet tablet, Take 1 tablet by mouth Daily., Disp: , Rfl:     traZODone (DESYREL) 50 MG tablet, Take 1 tablet by mouth Every Night., Disp: 30 tablet, Rfl: 0    Medication Considerations:  KELLY reviewed and appropriate.      Allergies:   No Known Allergies    Objective     Physical Exam:  Vital Signs: There were no vitals filed for this visit.  There is no height or weight on file to calculate BMI.     Mental Status Exam:   MENTAL STATUS EXAM   General Appearance:  Cleanly groomed and dressed  Eye Contact:  Good eye contact  Attitude:  Cooperative  Speech:  Normal rate, tone, volume  Language:  Spontaneous  Mood and affect:  Normal, pleasant  Hopelessness:  3  Loneliness: Denies  Thought Process:  Logical  Associations/ Thought Content:  No delusions  Hallucinations:  None  Suicidal Ideations:  Not present  Homicidal Ideation:  Not present  Sensorium:  Alert  Orientation:  Person and time  Immediate Recall, Recent, and Remote Memory:  Intact  Attention Span/ Concentration:  Good  Fund of Knowledge:  Appropriate for age and educational level  Intellectual Functioning:  Average range  Insight:  Fair  Judgement:  Fair  Reliability:  Good  Impulse Control:  Fair         Assessment / Plan      Visit Diagnosis/Orders Placed This Visit:  Diagnoses and all orders for this visit:    1. Situational mixed anxiety and depressive disorder [F43.23] (Primary)  -     FLUoxetine  (PROzac) 20 MG capsule; Take 1 capsule by mouth Daily for 90 days.  Dispense: 30 capsule; Refill: 2    2. Insomnia, unspecified type  -     hydrOXYzine (ATARAX) 25 MG tablet; Take 1 tablet by mouth 3 (Three) Times a Day As Needed for Anxiety.  Dispense: 90 tablet; Refill: 1           Functional Status: Moderate impairment    Prognosis: Fair with ongoing treatment    Impression/Formulation:  Patient appeared alert and oriented.  Patient is voluntarily requesting to begin outpatient therapy at [facility].  Patient is receptive to assistance with maintaining a stable lifestyle.  Patient presents with history of     ICD-10-CM ICD-9-CM   1. Situational mixed anxiety and depressive disorder [F43.23]  F43.23 309.28   2. Insomnia, unspecified type  G47.00 780.52     .     Treatment Plan:   - Prescribe Prozac 20 mg daily  - Prescribe Atarax 25 mg 3 times a day as needed for anxiety and insomnia  -Continue trazodone 50 mg at night, just recently sent prescription  -Continue with psychotherapy  -Follow-up with this provider in 4 weeks      The KLELY report, reviewed through PDMP, of the past 12 months were reviewed and is appropriate.  The patient/guardian reports taking the medication only as prescribed.  The patient/guardian denies any abuse or misuse of the medication.  The patient/guardian denies any other substance use or issues.  There are no apparent substance related issues.  The patient reports no side effects of the current medication usage.  The patient/guardian has reported significant improvement with medication usage and wishes to continue medication as prescribed.  The patient/guardian is appropriate to continue with current medication usage at this time.  Reinforced risks and side effects of medication usage, patient and/or guardian verbalize understanding in their own words and are in agreement with current plan.    Patient will continue supportive psychotherapy efforts and medications  as indicated. Clinic will obtain release of information for current treatment team for continuity of care as needed. Patient will contact this office, call 911 or present to the nearest emergency room should suicidal or homicidal ideations occur. Discussed medication options and treatment plan of prescribed medication(s) as well as the risks, benefits, and potential side effects. Patient ackowledged and verbally consented to continue with current treatment plan and was educated on the importance of compliance with treatment and follow-up appointments.     Follow Up:   Return in about 4 weeks (around 4/10/2025) for Med Check.      KRIS Jacobsen  [facility]     This is electronically signed by KRIS Jacobsen  02 /14/2025 16:39 EDT    Mode of Visit: Video   Location of patient: -HOME-   Location of provider: +[facility]+   You have chosen to receive care through a telehealth visit.   The patient has signed the video visit consent form.   The visit included audio and video interaction. No technical issues occurred during this visit.     Part of this note may be an electronic transcription/translation of spoken language to printed text using the Dragon Dictation System.

## 2025-03-21 DIAGNOSIS — I10 ESSENTIAL HYPERTENSION: ICD-10-CM

## 2025-03-21 RX ORDER — HYDROCHLOROTHIAZIDE 25 MG/1
25 TABLET ORAL DAILY
Qty: 90 TABLET | Refills: 0 | Status: SHIPPED | OUTPATIENT
Start: 2025-03-21

## 2025-03-28 ENCOUNTER — TELEMEDICINE (OUTPATIENT)
Dept: PSYCHIATRY | Facility: CLINIC | Age: 40
End: 2025-03-28
Payer: COMMERCIAL

## 2025-03-28 DIAGNOSIS — F43.23 SITUATIONAL MIXED ANXIETY AND DEPRESSIVE DISORDER: Primary | ICD-10-CM

## 2025-03-28 NOTE — PROGRESS NOTES
Follow Up Adult Note   Date:2025   Client Name: Dg Morse  : 1985   MRN: [id number]   Time IN: 10:02 am    Time OUT: 10:59 am     Mode of Visit: Video  Location of patient: -HOME-  Location of provider: +[facility]+  You have chosen to receive care through a telehealth visit.  The patient has signed the video visit consent form.  The visit included audio and video interaction. No technical issues occurred during this visit.    Referring Provider: Tonny Oliveros DO    Chief Complaint:      ICD-10-CM ICD-9-CM   1. Situational mixed anxiety and depressive disorder [F43.23]  F43.23 309.28      History of Present Illness:   Dg Morse is a 39 y.o. male who is being seen today for follow up individual psychotherapy counseling. Dg discussed one recent stressor in which he had to call the police; symptoms include sleep disturbance, low appetite, excessive worrying, feeling anxious and on edge, blames self, guilt; passive SI about 2 weeks ago, denied any current SI, and adamantly denied any intent or plan to act upon; identified persons of support. CBT was used to assist gD with processing thoughts/feelings and with exploring/building effective coping strategies; discussed safety planning if needed in the future, including coming to the emergency room or calling 911.      Objective   Medications:     Current Outpatient Medications:     amLODIPine (NORVASC) 5 MG tablet, Take 1 tablet by mouth Daily., Disp: 90 tablet, Rfl: 0    FLUoxetine (PROzac) 20 MG capsule, Take 1 capsule by mouth Daily for 90 days., Disp: 30 capsule, Rfl: 2    hydroCHLOROthiazide 25 MG tablet, Take 1 tablet by mouth Daily., Disp: 90 tablet, Rfl: 0    hydrOXYzine (ATARAX) 25 MG tablet, Take 1 tablet by mouth 3 (Three) Times a Day As Needed for Anxiety., Disp: 90 tablet, Rfl: 1    melatonin 5 MG tablet tablet, Take 1 tablet by mouth At Night As Needed., Disp: , Rfl:     multivitamin with minerals tablet tablet, Take 1  tablet by mouth Daily., Disp: , Rfl:     traZODone (DESYREL) 50 MG tablet, Take 1 tablet by mouth Every Night., Disp: 30 tablet, Rfl: 0    Allergies:   No Known Allergies    Mental Status Exam:   MENTAL STATUS EXAM   General Appearance:  Cleanly groomed and dressed  Eye Contact:  Good eye contact  Attitude:  Cooperative  Motor Activity:  Normal gait, posture  Muscle Strength:  Normal  Speech:  Normal rate, tone, volume  Language:  Spontaneous  Mood and affect:  Appropriate, mood congruent, dysthymic and anxious  Hopelessness:  Denies  Loneliness: Denies  Thought Process:  Logical  Associations/ Thought Content:  No delusions  Hallucinations:  None  Suicidal Ideations:  Not present  Homicidal Ideation:  Not present  Sensorium:  Alert  Orientation:  Person, place, time and situation  Immediate Recall, Recent, and Remote Memory:  Intact  Attention Span/ Concentration:  Good  Fund of Knowledge:  Appropriate for age and educational level  Intellectual Functioning:  Average range  Insight:  Good  Judgement:  Good  Reliability:  Good  Impulse Control:  Good     Subjective    PHQ-9 Depression Screening  Little interest or pleasure in doing things? (Patient-Rptd) Several days   Feeling down, depressed, or hopeless? (Patient-Rptd) Several days   PHQ-2 Total Score (Patient-Rptd) 2   Trouble falling or staying asleep, or sleeping too much? (Patient-Rptd) Several days   Feeling tired or having little energy? (Patient-Rptd) Several days   Poor appetite or overeating? (Patient-Rptd) Several days   Feeling bad about yourself - or that you are a failure or have let yourself or your family down? (Patient-Rptd) Several days   Trouble concentrating on things, such as reading the newspaper or watching television? (Patient-Rptd) Several days   Moving or speaking so slowly that other people could have noticed? Or the opposite - being so fidgety or restless that you have been moving around a lot more than usual? (Patient-Rptd) Several days    Thoughts that you would be better off dead, or of hurting yourself in some way? (Patient-Rptd) Several days   PHQ-9 Total Score (Patient-Rptd) 9   If you checked off any problems, how difficult have these problems made it for you to do your work, take care of things at home, or get along with other people? (Patient-Rptd) Somewhat difficult     EUGENE-7  Feeling nervous, anxious or on edge: (Patient-Rptd) Several days  Not being able to stop or control worrying: (Patient-Rptd) Several days  Worrying too much about different things: (Patient-Rptd) Several days  Trouble Relaxing: (Patient-Rptd) Several days  Being so restless that it is hard to sit still: (Patient-Rptd) Not at all  Feeling afraid as if something awful might happen: (Patient-Rptd) Several days  Becoming easily annoyed or irritable: (Patient-Rptd) Not at all  EUGENE 7 Total Score: (Patient-Rptd) 5  If you checked any problems, how difficult have these problems made it for you to do your work, take care of things at home, or get along with other people: (Patient-Rptd) Somewhat difficult    Client's Support Network Includes:  mother and brother, wife  Functional Status: Moderate impairment   Progress toward goal: At goal  Prognosis: Good with Ongoing Treatment     Assessment / Plan / Progress   Visit Diagnosis/Orders Placed This Visit:    ICD-10-CM ICD-9-CM   1. Situational mixed anxiety and depressive disorder [F43.23]  F43.23 309.28      PLAN:  Safety: No acute safety concerns  Risk Assessment: Risk of self-harm acutely is low. Risk of self-harm chronically is also low, but could be further elevated in the event of treatment noncompliance and/or AODA.    Treatment Plan/Goals & Progress: Continue supportive psychotherapy efforts and medications as indicated. Treatment options discussed during today's visit. Client ackowledged and verbally consented to continue with current treatment plan and was educated on the importance of compliance with treatment and  follow-up appointments. Client seems reasonably able to adhere to treatment plan.      Assisted client in processing above session content; acknowledged and normalized client’s thoughts, feelings, and concerns.     Allowed client to freely discuss issues without interruption or judgement with unconditional positive regard, active listening skills, and empathy. Clinician provided a safe, confidential environment to facilitate the development of a positive therapeutic relationship and encouraged open, honest communication. Assisted client in identifying risk factors which would indicate the need for higher level of care including thoughts to harm self or others and/or self-harming behavior and encouraged client to contact this office, call 911, or present to the nearest emergency room should any of these events occur. Discussed crisis intervention services and means to access. Client adamantly and convincingly denies current suicidal or homicidal ideation or perceptual disturbance. Assisted client in processing session content; acknowledged and normalized client’s thoughts, feelings, and concerns by utilizing a person-centered approach in efforts to build appropriate rapport and a positive therapeutic relationship with open and honest communication.      Follow Up:   Return in about 4 weeks (around 4/25/2025) for Video visit, therapy session.    Kasey Benitez LCSW  [facility]

## 2025-04-04 ENCOUNTER — OFFICE VISIT (OUTPATIENT)
Dept: INTERNAL MEDICINE | Facility: CLINIC | Age: 40
End: 2025-04-04
Payer: COMMERCIAL

## 2025-04-04 VITALS
TEMPERATURE: 97.8 F | RESPIRATION RATE: 16 BRPM | SYSTOLIC BLOOD PRESSURE: 126 MMHG | OXYGEN SATURATION: 100 % | DIASTOLIC BLOOD PRESSURE: 84 MMHG | WEIGHT: 235 LBS | HEART RATE: 84 BPM | BODY MASS INDEX: 31.83 KG/M2 | HEIGHT: 72 IN

## 2025-04-04 DIAGNOSIS — E66.09 CLASS 1 OBESITY DUE TO EXCESS CALORIES WITH SERIOUS COMORBIDITY AND BODY MASS INDEX (BMI) OF 33.0 TO 33.9 IN ADULT: ICD-10-CM

## 2025-04-04 DIAGNOSIS — E66.811 CLASS 1 OBESITY DUE TO EXCESS CALORIES WITH SERIOUS COMORBIDITY AND BODY MASS INDEX (BMI) OF 33.0 TO 33.9 IN ADULT: ICD-10-CM

## 2025-04-04 DIAGNOSIS — F43.23 ADJUSTMENT DISORDER WITH MIXED ANXIETY AND DEPRESSED MOOD: ICD-10-CM

## 2025-04-04 DIAGNOSIS — I10 ESSENTIAL HYPERTENSION: ICD-10-CM

## 2025-04-04 DIAGNOSIS — Z00.00 ENCOUNTER FOR PREVENTIVE HEALTH EXAMINATION: Primary | ICD-10-CM

## 2025-04-04 DIAGNOSIS — E73.9 LACTOSE INTOLERANCE: ICD-10-CM

## 2025-04-04 PROCEDURE — 99395 PREV VISIT EST AGE 18-39: CPT | Performed by: INTERNAL MEDICINE

## 2025-04-04 RX ORDER — AMLODIPINE BESYLATE 5 MG/1
5 TABLET ORAL
Qty: 90 TABLET | Refills: 3 | Status: SHIPPED | OUTPATIENT
Start: 2025-04-04

## 2025-04-04 RX ORDER — HYDROCHLOROTHIAZIDE 25 MG/1
25 TABLET ORAL DAILY
Qty: 90 TABLET | Refills: 3 | Status: SHIPPED | OUTPATIENT
Start: 2025-04-04

## 2025-04-04 NOTE — PROGRESS NOTES
Chief Complaint   Patient presents with    Annual Exam       Subjective     History of Present Illness  The patient is a 39-year-old male presenting for an annual exam. His medical conditions include adjustment disorder with anxiety and depression, hypertension, and obesity.    He has been under the care of behavioral health, which has made some medication adjustments over the past year. He reports no dental issues, constipation, or diarrhea. However, he notes an increased sensitivity to lactose. He is currently on Prozac, hydroxyzine, and trazodone.    He was prescribed amlodipine and hydrochlorothiazide (HCTZ) for his blood pressure. He recently refilled his hydrochlorothiazide prescription.    He has been actively managing his weight through dietary modifications and increased physical activity, with a target weight of 225 pounds. His regimen includes cardio exercises, intermittent fasting, and a reduced carbohydrate diet. He also monitors his sugar intake, opting for fruit as a healthier alternative. 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 He did not fast before these tests and was not informed of any cholesterol-related concerns.      The following portions of the patient's history were reviewed and updated as appropriate: allergies, current medications, past family history, past medical history, past social history, past surgical history and problem list.    Review of Systems   Constitutional:  Negative for chills, fatigue and fever.   HENT:  Negative for congestion, ear pain, rhinorrhea, sinus pressure and sore throat.    Eyes:  Negative for visual disturbance.   Respiratory:  Negative for cough, chest tightness, shortness of breath and wheezing.    Cardiovascular:  Negative for chest pain, palpitations and leg swelling.   Gastrointestinal:  Negative for abdominal pain, blood in stool, constipation, diarrhea, nausea  "and vomiting.   Endocrine: Negative for polydipsia and polyuria.   Genitourinary:  Negative for dysuria and hematuria.   Musculoskeletal:  Negative for arthralgias and back pain.   Skin:  Negative for rash.   Neurological:  Negative for dizziness, light-headedness, numbness and headaches.   Psychiatric/Behavioral:  Negative for dysphoric mood and sleep disturbance. The patient is not nervous/anxious.        No Known Allergies    Past Medical History:   Diagnosis Date    Anxiety     Depression     Hypertension     Seasonal allergies        Social History     Socioeconomic History    Marital status:    Tobacco Use    Smoking status: Never     Passive exposure: Never    Smokeless tobacco: Never   Vaping Use    Vaping status: Never Used   Substance and Sexual Activity    Alcohol use: Not Currently     Comment: occasionally    Drug use: Never    Sexual activity: Yes     Partners: Female     Birth control/protection: None, Surgical        History reviewed. No pertinent surgical history.    Family History   Problem Relation Age of Onset    Hypertension Mother     Parkinsonism Father          Current Outpatient Medications:     amLODIPine (NORVASC) 5 MG tablet, Take 1 tablet by mouth Daily., Disp: 90 tablet, Rfl: 3    FLUoxetine (PROzac) 20 MG capsule, Take 1 capsule by mouth Daily for 90 days., Disp: 30 capsule, Rfl: 2    hydroCHLOROthiazide 25 MG tablet, Take 1 tablet by mouth Daily., Disp: 90 tablet, Rfl: 3    hydrOXYzine (ATARAX) 25 MG tablet, Take 1 tablet by mouth 3 (Three) Times a Day As Needed for Anxiety., Disp: 90 tablet, Rfl: 1    melatonin 5 MG tablet tablet, Take 1 tablet by mouth At Night As Needed., Disp: , Rfl:     multivitamin with minerals tablet tablet, Take 1 tablet by mouth Daily., Disp: , Rfl:     traZODone (DESYREL) 50 MG tablet, Take 1 tablet by mouth Every Night., Disp: 30 tablet, Rfl: 0    Objective   /84   Pulse 84   Temp 97.8 °F (36.6 °C) (Temporal)   Resp 16   Ht 182.9 cm (72\")  "  Wt 107 kg (235 lb)   SpO2 100%   BMI 31.87 kg/m²     Physical Exam  Vitals and nursing note reviewed.   Constitutional:       Appearance: Normal appearance. He is well-developed.   HENT:      Head: Normocephalic and atraumatic.   Eyes:      Extraocular Movements: Extraocular movements intact.      Conjunctiva/sclera: Conjunctivae normal.   Pulmonary:      Effort: Pulmonary effort is normal.   Musculoskeletal:      Cervical back: Normal range of motion and neck supple.   Skin:     General: Skin is warm and dry.      Findings: No rash.   Neurological:      General: No focal deficit present.      Mental Status: He is alert and oriented to person, place, and time.   Psychiatric:         Mood and Affect: Mood normal.         Behavior: Behavior normal.           Results for orders placed or performed during the hospital encounter of 12/04/24   T4, Free    Collection Time: 12/04/24  2:19 AM    Specimen: Blood   Result Value Ref Range    Free T4 1.35 0.92 - 1.68 ng/dL       Assessment & Plan   Diagnoses and all orders for this visit:    1. Encounter for preventive health examination (Primary)  -     CBC & Differential  -     Comprehensive Metabolic Panel  -     Lipid Panel  -     Hemoglobin A1c    2. Essential hypertension  -     CBC & Differential  -     Comprehensive Metabolic Panel  -     Lipid Panel  -     Hemoglobin A1c  -     amLODIPine (NORVASC) 5 MG tablet; Take 1 tablet by mouth Daily.  Dispense: 90 tablet; Refill: 3  -     hydroCHLOROthiazide 25 MG tablet; Take 1 tablet by mouth Daily.  Dispense: 90 tablet; Refill: 3    3. Adjustment disorder with mixed anxiety and depressed mood    4. Class 1 obesity due to excess calories with serious comorbidity and body mass index (BMI) of 33.0 to 33.9 in adult  -     CBC & Differential  -     Comprehensive Metabolic Panel  -     Lipid Panel  -     Hemoglobin A1c    5. Lactose intolerance        Discussion Summary:  Patient is a 39 y.o. male presenting for annual  physical    Preventive Health Maintenance  - Baseline labs are up-to-date or ordered per above.  - Vaccines reviewed and updated  - Preventive health measures were discussed including: healthy diet with increase in fruits and vegetables, regular exercise at least 3 times a week, safe sex practices, avoidance of drugs, tobacco, and alcohol, and regular seatbelt use.    Assessment & Plan  Adjustment disorder with anxiety and depression.  He has been following up with behavioral health and is currently on Prozac, hydroxyzine, and trazodone. He reports doing well with this combination.    Hypertension.  His blood pressure readings are within the normal range today. He is on amlodipine and hydrochlorothiazide (HCTZ). A prescription for a year's supply of hydrochlorothiazide will be provided.    Obesity.  He has experienced a weight loss of approximately 15 pounds. He is advised to continue his current regimen of increased cardio exercise, intermittent fasting, and monitoring carbohydrate and sugar intake. His goal weight is 225 pounds.    Hyperlipidemia  His LDL cholesterol levels were elevated during his last non-fasting lab work. He is advised to undergo fasting lab work to monitor his cholesterol levels closely. If his cholesterol levels remain high even after fasting, statin therapy may be considered for a few months, followed by a repeat cholesterol check.    Lactose intolerance.  He reports increased sensitivity to lactose. He is advised to moderate his dairy intake and consider using Lactaid tablets to help digest lactose.         Follow up:  Return in about 1 year (around 4/4/2026) for Annual physical.     Patient Instructions:  Patient instructions were provided.    Patient or patient representative verbalized consent for the use of Ambient Listening during the visit with  Tonny Oliveros DO for chart documentation. 4/4/2025  11:44 EDT

## 2025-04-05 ENCOUNTER — LAB (OUTPATIENT)
Dept: LAB | Facility: HOSPITAL | Age: 40
End: 2025-04-05
Payer: COMMERCIAL

## 2025-04-05 LAB
ALBUMIN SERPL-MCNC: 4.3 G/DL (ref 3.5–5.2)
ALBUMIN/GLOB SERPL: 1.4 G/DL
ALP SERPL-CCNC: 41 U/L (ref 39–117)
ALT SERPL W P-5'-P-CCNC: 40 U/L (ref 1–41)
ANION GAP SERPL CALCULATED.3IONS-SCNC: 12.2 MMOL/L (ref 5–15)
AST SERPL-CCNC: 22 U/L (ref 1–40)
BASOPHILS # BLD AUTO: 0.03 10*3/MM3 (ref 0–0.2)
BASOPHILS NFR BLD AUTO: 0.3 % (ref 0–1.5)
BILIRUB SERPL-MCNC: 0.7 MG/DL (ref 0–1.2)
BUN SERPL-MCNC: 10 MG/DL (ref 6–20)
BUN/CREAT SERPL: 10 (ref 7–25)
CALCIUM SPEC-SCNC: 9.4 MG/DL (ref 8.6–10.5)
CHLORIDE SERPL-SCNC: 104 MMOL/L (ref 98–107)
CHOLEST SERPL-MCNC: 273 MG/DL (ref 0–200)
CO2 SERPL-SCNC: 23.8 MMOL/L (ref 22–29)
CREAT SERPL-MCNC: 1 MG/DL (ref 0.76–1.27)
DEPRECATED RDW RBC AUTO: 41 FL (ref 37–54)
EGFRCR SERPLBLD CKD-EPI 2021: 98.2 ML/MIN/1.73
EOSINOPHIL # BLD AUTO: 0.09 10*3/MM3 (ref 0–0.4)
EOSINOPHIL NFR BLD AUTO: 1 % (ref 0.3–6.2)
ERYTHROCYTE [DISTWIDTH] IN BLOOD BY AUTOMATED COUNT: 12.6 % (ref 12.3–15.4)
GLOBULIN UR ELPH-MCNC: 3 GM/DL
GLUCOSE SERPL-MCNC: 99 MG/DL (ref 65–99)
HBA1C MFR BLD: 5.8 % (ref 4.8–5.6)
HCT VFR BLD AUTO: 53 % (ref 37.5–51)
HDLC SERPL-MCNC: 30 MG/DL (ref 40–60)
HGB BLD-MCNC: 17.4 G/DL (ref 13–17.7)
IMM GRANULOCYTES # BLD AUTO: 0.01 10*3/MM3 (ref 0–0.05)
IMM GRANULOCYTES NFR BLD AUTO: 0.1 % (ref 0–0.5)
LDLC SERPL CALC-MCNC: 179 MG/DL (ref 0–100)
LDLC/HDLC SERPL: 5.93 {RATIO}
LYMPHOCYTES # BLD AUTO: 3.81 10*3/MM3 (ref 0.7–3.1)
LYMPHOCYTES NFR BLD AUTO: 40.4 % (ref 19.6–45.3)
MCH RBC QN AUTO: 29.4 PG (ref 26.6–33)
MCHC RBC AUTO-ENTMCNC: 32.8 G/DL (ref 31.5–35.7)
MCV RBC AUTO: 89.7 FL (ref 79–97)
MONOCYTES # BLD AUTO: 0.61 10*3/MM3 (ref 0.1–0.9)
MONOCYTES NFR BLD AUTO: 6.5 % (ref 5–12)
NEUTROPHILS NFR BLD AUTO: 4.89 10*3/MM3 (ref 1.7–7)
NEUTROPHILS NFR BLD AUTO: 51.7 % (ref 42.7–76)
NRBC BLD AUTO-RTO: 0 /100 WBC (ref 0–0.2)
PLATELET # BLD AUTO: 194 10*3/MM3 (ref 140–450)
PMV BLD AUTO: 10.7 FL (ref 6–12)
POTASSIUM SERPL-SCNC: 3.9 MMOL/L (ref 3.5–5.2)
PROT SERPL-MCNC: 7.3 G/DL (ref 6–8.5)
RBC # BLD AUTO: 5.91 10*6/MM3 (ref 4.14–5.8)
SODIUM SERPL-SCNC: 140 MMOL/L (ref 136–145)
TRIGL SERPL-MCNC: 326 MG/DL (ref 0–150)
VLDLC SERPL-MCNC: 64 MG/DL (ref 5–40)
WBC NRBC COR # BLD AUTO: 9.44 10*3/MM3 (ref 3.4–10.8)

## 2025-04-05 PROCEDURE — 80053 COMPREHEN METABOLIC PANEL: CPT | Performed by: INTERNAL MEDICINE

## 2025-04-05 PROCEDURE — 80061 LIPID PANEL: CPT | Performed by: INTERNAL MEDICINE

## 2025-04-05 PROCEDURE — 36415 COLL VENOUS BLD VENIPUNCTURE: CPT | Performed by: INTERNAL MEDICINE

## 2025-04-05 PROCEDURE — 83036 HEMOGLOBIN GLYCOSYLATED A1C: CPT | Performed by: INTERNAL MEDICINE

## 2025-04-05 PROCEDURE — 85025 COMPLETE CBC W/AUTO DIFF WBC: CPT | Performed by: INTERNAL MEDICINE

## 2025-04-06 DIAGNOSIS — E78.2 MIXED HYPERLIPIDEMIA: Primary | ICD-10-CM

## 2025-04-06 RX ORDER — ATORVASTATIN CALCIUM 10 MG/1
10 TABLET, FILM COATED ORAL DAILY
Qty: 90 TABLET | Refills: 3 | Status: SHIPPED | OUTPATIENT
Start: 2025-04-06

## 2025-04-09 DIAGNOSIS — I10 ESSENTIAL HYPERTENSION: ICD-10-CM

## 2025-04-09 RX ORDER — AMLODIPINE BESYLATE 5 MG/1
5 TABLET ORAL
Qty: 90 TABLET | Refills: 3 | Status: SHIPPED | OUTPATIENT
Start: 2025-04-09

## 2025-04-10 ENCOUNTER — TELEMEDICINE (OUTPATIENT)
Dept: PSYCHIATRY | Facility: CLINIC | Age: 40
End: 2025-04-10
Payer: COMMERCIAL

## 2025-04-10 DIAGNOSIS — F43.23 SITUATIONAL MIXED ANXIETY AND DEPRESSIVE DISORDER: ICD-10-CM

## 2025-04-10 DIAGNOSIS — G47.00 INSOMNIA, UNSPECIFIED TYPE: ICD-10-CM

## 2025-04-10 RX ORDER — TRAZODONE HYDROCHLORIDE 50 MG/1
50 TABLET ORAL NIGHTLY
Qty: 90 TABLET | Refills: 1 | Status: SHIPPED | OUTPATIENT
Start: 2025-04-10

## 2025-04-10 NOTE — PROGRESS NOTES
Video Visit      Patient Name: Dg Morse  : 1985   MRN: [id number]     Referring Provider: Tonny Oliveros DO    Chief Complaint:      ICD-10-CM ICD-9-CM   1. Insomnia, unspecified type  G47.00 780.52   2. Situational mixed anxiety and depressive disorder [F43.23]  F43.23 309.28        This provider is located at the [facility] (through [facility]), [address], [address], [place] [facility] [address] using a secure [employer] Video Visit through [employer]. Patient is being seen remotely via telehealth video visit at their home address in [place], and stated they are in a secure environment for this session. The patient's condition being diagnosed/treated is appropriate for telemedicine. The provider identified herself as well as her credentials. The patient, and/or patients guardian, consent to be seen remotely, and when consent is given they understand that the consent allows for patient identifiable information to be sent to a third party as needed. They may refuse to be seen remotely at any time. The electronic data is encrypted and password protected, and the patient and/or guardian has been advised of the potential risks to privacy not withstanding such measures.    The patient has chosen to receive care today through a telehealth video visit. Do you consent to use a video/audio connection for your medical care today? Yes    History of Present Illness:     History of Present Illness  Dg Morse is a 39 y.o. male who is being seen by a video visit today for medication and symptom management. He reports an improvement in his sleep pattern, with the ability to maintain uninterrupted sleep throughout the night. He has been taking trazodone, which has been beneficial in managing his sleep issues. He is considering adjusting the timing of his trazodone intake to earlier in the evening, as he typically takes it around 8:00 PM and it starts to work around 9  PM, but prefers to be in bed by 8:30 PM. He recently had an annual physical and checkup with his primary care physician, who noted that his blood pressure had decreased. His cholesterol levels were found to be elevated, and he was started on a statin. He is curious about the potential impact of this new medication on his current regimen.  He was instructed that there was no interaction with his medications at this time. He acknowledges the need to increase his vegetable intake and reduce his consumption of fried and processed foods. He has been engaging in regular exercise with his wife recently.  He has lost around 15 pounds due to depression and life situations that he is going through with his wife due to his infidelity.  However, he is making efforts to maintain this weight loss and improve his dietary habits. He is currently experiencing marital difficulties, with his wife contemplating separation or divorce. This situation has been a source of stress for him, leading to occasional feelings of depression. He expresses remorse for his actions and their impact on his wife's emotional state. He believes his current dose of Prozac is effective and reports no thoughts of self-harm. He has been participating in individual therapy, which he finds beneficial, but feels that they have only begun to explore the deeper issues contributing to his current mental health status. 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  However, overall he seems to be doing some better.  His medications will be continued without change at this time.  He was instructed with any new or worsening symptoms to notify this provider.  He was instructed with any thoughts of self-harm or suicidal ideation with an intent or plan to go directly to the emergency room.  He was instructed to maintain adequate nutrition and hydration as well as  continuing to stay active to help improve his overall mental physical wellbeing.  He verbalizes understanding to all.  He denies any recent or current SI/HI/AVH.  He will follow up with this provider in 8 weeks or sooner if needed for medication symptom management.  He is also encouraged to continue to participate in psychotherapy.    MEDICATIONS  Current: Trazodone, Prozac        Subjective      Review of Systems:   Review of Systems   Psychiatric/Behavioral:  Positive for sleep disturbance, depressed mood and stress. The patient is nervous/anxious.        Screening Scores:   PHQ-9 Total Score: (Patient-Rptd) 5  EUGENE-7  Feeling nervous, anxious or on edge: (Patient-Rptd) Several days  Not being able to stop or control worrying: (Patient-Rptd) Not at all  Worrying too much about different things: (Patient-Rptd) Several days  Trouble Relaxing: (Patient-Rptd) Several days  Being so restless that it is hard to sit still: (Patient-Rptd) Not at all  Feeling afraid as if something awful might happen: (Patient-Rptd) Several days  Becoming easily annoyed or irritable: (Patient-Rptd) Not at all  EUGENE 7 Total Score: (Patient-Rptd) 4  If you checked any problems, how difficult have these problems made it for you to do your work, take care of things at home, or get along with other people: (Patient-Rptd) Somewhat difficult      RISK ASSESSMENT:  Patient denies any thoughts of suicide or intent today. Patient denies any suicidal or homicidal ideation today. Patient denies any high risk factors today.     Medications:     Current Outpatient Medications:     FLUoxetine (PROzac) 20 MG capsule, Take 1 capsule by mouth Daily for 180 days., Disp: 90 capsule, Rfl: 1    traZODone (DESYREL) 50 MG tablet, Take 1 tablet by mouth Every Night., Disp: 90 tablet, Rfl: 1    amLODIPine (NORVASC) 5 MG tablet, TAKE 1 TABLET BY MOUTH DAILY, Disp: 90 tablet, Rfl: 3    atorvastatin (LIPITOR) 10 MG tablet, Take 1 tablet by mouth Daily., Disp: 90 tablet,  Rfl: 3    hydroCHLOROthiazide 25 MG tablet, Take 1 tablet by mouth Daily., Disp: 90 tablet, Rfl: 3    hydrOXYzine (ATARAX) 25 MG tablet, Take 1 tablet by mouth 3 (Three) Times a Day As Needed for Anxiety., Disp: 90 tablet, Rfl: 1    melatonin 5 MG tablet tablet, Take 1 tablet by mouth At Night As Needed., Disp: , Rfl:     multivitamin with minerals tablet tablet, Take 1 tablet by mouth Daily., Disp: , Rfl:     Medication Considerations:  KELLY reviewed and appropriate.      Allergies:   No Known Allergies    Objective     Physical Exam:  Vital Signs: There were no vitals filed for this visit.  There is no height or weight on file to calculate BMI.     Mental Status Exam:   MENTAL STATUS EXAM   General Appearance:  Cleanly groomed and dressed  Eye Contact:  Good eye contact  Attitude:  Cooperative  Speech:  Normal rate, tone, volume  Language:  Spontaneous  Mood and affect:  Normal, pleasant  Hopelessness:  3  Loneliness: Denies  Thought Process:  Logical  Associations/ Thought Content:  No delusions  Hallucinations:  None  Suicidal Ideations:  Not present  Homicidal Ideation:  Not present  Sensorium:  Alert  Orientation:  Person, place, time and situation  Immediate Recall, Recent, and Remote Memory:  Intact  Attention Span/ Concentration:  Good  Fund of Knowledge:  Appropriate for age and educational level  Intellectual Functioning:  Average range  Insight:  Fair  Judgement:  Fair  Reliability:  Fair  Impulse Control:  Fair         Assessment / Plan      Visit Diagnosis/Orders Placed This Visit:  Diagnoses and all orders for this visit:    1. Insomnia, unspecified type  -     traZODone (DESYREL) 50 MG tablet; Take 1 tablet by mouth Every Night.  Dispense: 90 tablet; Refill: 1    2. Situational mixed anxiety and depressive disorder [F43.23]  -     FLUoxetine (PROzac) 20 MG capsule; Take 1 capsule by mouth Daily for 180 days.  Dispense: 90 capsule; Refill: 1         Functional Status: Moderate impairment      Prognosis: Fair with Ongoing Treatment     Impression/Formulation:  Patient appeared alert and oriented.  Patient is voluntarily requesting to begin outpatient therapy at [facility].  Patient is receptive to assistance with maintaining a stable lifestyle.  Patient presents with history of     ICD-10-CM ICD-9-CM   1. Insomnia, unspecified type  G47.00 780.52   2. Situational mixed anxiety and depressive disorder [F43.23]  F43.23 309.28   .     Treatment Plan:   - Continue Prozac 20 mg daily, sent refills  - Continue Trazodone 50 mg at night as needed for insomnia, sent refills  - Continue in psychotherapy  - Follow up in 8 weeks      The KELLY report, reviewed through PDMP, of the past 12 months were reviewed and is appropriate.  The patient/guardian reports taking the medication only as prescribed.  The patient/guardian denies any abuse or misuse of the medication.  The patient/guardian denies any other substance use or issues.  There are no apparent substance related issues.  The patient reports no side effects of the current medication usage.  The patient/guardian has reported significant improvement with medication usage and wishes to continue medication as prescribed.  The patient/guardian is appropriate to continue with current medication usage at this time.  Reinforced risks and side effects of medication usage, patient and/or guardian verbalize understanding in their own words and are in agreement with current plan.    This APRN has discussed with the patient/guardian about the possibility of serotonin syndrome when certain medications are taken together, as is the case with this patient.  This APRN has provided the patient/guardian with a list of symptoms of serotonin syndrome including symptoms of autonomic instability, altered sensorium, confusion, restlessness, agitation, myoclonus, hyperreflexia, hyperthermia, diaphoresis, tremor, chills, diarrhea and cramps, ataxia,  headache, migraines, seizures, and insomnia; which could lead to permanent hyperthermic brain damage, cardiovascular collapse, coma, or even death.  The patient/guardian are instructed to stop medications immediately and either contact this KRIS/debi office during regular office hours, or go to the emergency department/call 911, if they begin to experience any of the symptoms discussed.  The benefits and risks of the current medication regimen are discussed with the patient/guardian, and they feel that the benefits out weigh the risks.  The patient/guardian verbalized understanding and agreement in their own words.    Patient will continue supportive psychotherapy efforts and medications as indicated. Clinic will obtain release of information for current treatment team for continuity of care as needed. Patient will contact this office, call 911 or present to the nearest emergency room should suicidal or homicidal ideations occur. Discussed medication options and treatment plan of prescribed medication(s) as well as the risks, benefits, and potential side effects. Patient ackowledged and verbally consented to continue with current treatment plan and was educated on the importance of compliance with treatment and follow-up appointments.     Follow Up:   Return in about 8 weeks (around 6/5/2025) for Med Check.      KRIS Jacobsen  [facility]     This is electronically signed by KRIS Jacobsen  04/10/2025 16:06 EDT    Mode of Visit: Video   Location of patient: -WORK-   Location of provider[facility]   You have chosen to receive care through a telehealth visit.   The patient has signed the video visit consent form.   The visit included audio and video interaction. No technical issues occurred during this visit.     Patient or patient representative verbalized consent for the use of Ambient Listening during the visit with  KRIS Farmer for chart  documentation. 4/10/2025  15:58 EDT      Part of this note may be an electronic transcription/translation of spoken language to printed text using the Dragon Dictation System.

## 2025-04-25 ENCOUNTER — TELEMEDICINE (OUTPATIENT)
Dept: PSYCHIATRY | Facility: CLINIC | Age: 40
End: 2025-04-25
Payer: COMMERCIAL

## 2025-04-25 DIAGNOSIS — F43.23 SITUATIONAL MIXED ANXIETY AND DEPRESSIVE DISORDER: Primary | ICD-10-CM

## 2025-04-25 NOTE — PROGRESS NOTES
Follow Up Adult Note   Date:2025   Client Name: Dg Morse  : 1985   MRN: [id number]   Time IN: 10:01 am    Time OUT: 10:58 am     Mode of Visit: Video  Location of patient: -HOME-  Location of provider: [facility]  You have chosen to receive care through a telehealth visit.  The patient has signed the video visit consent form.  The visit included audio and video interaction. No technical issues occurred during this visit.    Referring Provider: Tonny Oliveros DO    Chief Complaint:      ICD-10-CM ICD-9-CM   1. Situational mixed anxiety and depressive disorder [F43.23]  F43.23 309.28      History of Present Illness:   Dg Morse is a 40 y.o. male who is being seen today for follow up individual psychotherapy counseling. Dg discussed recent stressors including interpersonal, family, medical; symptoms include feeling anxious, worrying; sleep has improved, is exercising more, taking more spiritual steps. CBT was used to assist Dg with processing thoughts/feelings and with exploring/building effective coping techniques (using self-reflection/analysis, setting healthy boundaries when/if needed, using open communication).     Objective   Medications:     Current Outpatient Medications:     amLODIPine (NORVASC) 5 MG tablet, TAKE 1 TABLET BY MOUTH DAILY, Disp: 90 tablet, Rfl: 3    atorvastatin (LIPITOR) 10 MG tablet, Take 1 tablet by mouth Daily., Disp: 90 tablet, Rfl: 3    FLUoxetine (PROzac) 20 MG capsule, Take 1 capsule by mouth Daily for 180 days., Disp: 90 capsule, Rfl: 1    hydroCHLOROthiazide 25 MG tablet, Take 1 tablet by mouth Daily., Disp: 90 tablet, Rfl: 3    hydrOXYzine (ATARAX) 25 MG tablet, Take 1 tablet by mouth 3 (Three) Times a Day As Needed for Anxiety., Disp: 90 tablet, Rfl: 1    melatonin 5 MG tablet tablet, Take 1 tablet by mouth At Night As Needed., Disp: , Rfl:     multivitamin with minerals tablet tablet, Take 1 tablet by mouth Daily., Disp: , Rfl:     traZODone  (DESYREL) 50 MG tablet, Take 1 tablet by mouth Every Night., Disp: 90 tablet, Rfl: 1    Allergies:   No Known Allergies    Mental Status Exam:   MENTAL STATUS EXAM   General Appearance:  Cleanly groomed and dressed  Eye Contact:  Good eye contact  Attitude:  Cooperative  Motor Activity:  Normal gait, posture  Muscle Strength:  Normal  Speech:  Normal rate, tone, volume  Language:  Spontaneous  Mood and affect:  Appropriate, mood congruent and dysthymic  Hopelessness:  Denies  Loneliness: Denies  Thought Process:  Logical  Associations/ Thought Content:  No delusions  Hallucinations:  None  Suicidal Ideations:  Not present  Homicidal Ideation:  Not present  Sensorium:  Alert  Orientation:  Person, place, time and situation  Immediate Recall, Recent, and Remote Memory:  Intact  Attention Span/ Concentration:  Good  Fund of Knowledge:  Appropriate for age and educational level  Intellectual Functioning:  Average range  Insight:  Good  Judgement:  Good  Reliability:  Good  Impulse Control:  Good     Subjective    PHQ-9 Depression Screening  Little interest or pleasure in doing things? (Patient-Rptd) Not at all   Feeling down, depressed, or hopeless? (Patient-Rptd) Several days   PHQ-2 Total Score (Patient-Rptd) 1   Trouble falling or staying asleep, or sleeping too much? (Patient-Rptd) Several days   Feeling tired or having little energy? (Patient-Rptd) Several days   Poor appetite or overeating? (Patient-Rptd) Several days   Feeling bad about yourself - or that you are a failure or have let yourself or your family down? (Patient-Rptd) Several days   Trouble concentrating on things, such as reading the newspaper or watching television? (Patient-Rptd) Several days   Moving or speaking so slowly that other people could have noticed? Or the opposite - being so fidgety or restless that you have been moving around a lot more than usual? (Patient-Rptd) Not at all   Thoughts that you would be better off dead, or of hurting  yourself in some way? (Patient-Rptd) Not at all   PHQ-9 Total Score (Patient-Rptd) 6   If you checked off any problems, how difficult have these problems made it for you to do your work, take care of things at home, or get along with other people? (Patient-Rptd) Somewhat difficult     EUGENE-7  Feeling nervous, anxious or on edge: (Patient-Rptd) Several days  Not being able to stop or control worrying: (Patient-Rptd) Several days  Worrying too much about different things: (Patient-Rptd) Several days  Trouble Relaxing: (Patient-Rptd) Several days  Being so restless that it is hard to sit still: (Patient-Rptd) Not at all  Feeling afraid as if something awful might happen: (Patient-Rptd) Several days  Becoming easily annoyed or irritable: (Patient-Rptd) Not at all  EUGENE 7 Total Score: (Patient-Rptd) 5  If you checked any problems, how difficult have these problems made it for you to do your work, take care of things at home, or get along with other people: (Patient-Rptd) Somewhat difficult    Client's Support Network Includes:  wife, mother, and paul  Functional Status: Mild impairment   Progress toward goal: At goal  Prognosis: Good with Ongoing Treatment     Assessment / Plan / Progress   Visit Diagnosis/Orders Placed This Visit:    ICD-10-CM ICD-9-CM   1. Situational mixed anxiety and depressive disorder [F43.23]  F43.23 309.28      PLAN:  Safety: No acute safety concerns  Risk Assessment: Risk of self-harm acutely is low. Risk of self-harm chronically is also low, but could be further elevated in the event of treatment noncompliance and/or AODA.    Treatment Plan/Goals & Progress: Continue supportive psychotherapy efforts and medications as indicated. Treatment options discussed during today's visit. Client acknowledged and verbally consented to continue with current treatment plan and was educated on the importance of compliance with treatment and follow-up appointments. Client seems reasonably able to adhere to  treatment plan.      Assisted client in processing above session content; acknowledged and normalized client’s thoughts, feelings, and concerns.     Allowed client to freely discuss issues without interruption or judgement with unconditional positive regard, active listening skills, and empathy. Clinician provided a safe, confidential environment to facilitate the development of a positive therapeutic relationship and encouraged open, honest communication. Assisted client in identifying risk factors which would indicate the need for higher level of care including thoughts to harm self or others and/or self-harming behavior and encouraged client to contact this office, call 911, or present to the nearest emergency room should any of these events occur. Discussed crisis intervention services and means to access. Client adamantly and convincingly denies current suicidal or homicidal ideation or perceptual disturbance. Assisted client in processing session content; acknowledged and normalized client’s thoughts, feelings, and concerns by utilizing a person-centered approach in efforts to build appropriate rapport and a positive therapeutic relationship with open and honest communication.      Follow Up:   Return in about 4 weeks (around 5/23/2025) for therapy session, Video visit.    Kasey Benitez LCSW  [facility]

## 2025-05-09 ENCOUNTER — TELEMEDICINE (OUTPATIENT)
Dept: PSYCHIATRY | Facility: CLINIC | Age: 40
End: 2025-05-09
Payer: COMMERCIAL

## 2025-05-09 DIAGNOSIS — F43.23 SITUATIONAL MIXED ANXIETY AND DEPRESSIVE DISORDER: Primary | ICD-10-CM

## 2025-05-09 NOTE — PROGRESS NOTES
Follow Up Adult Note   Date:2025   Client Name: Dg Morse  : 1985   MRN: [id number]   Time IN: 10:02 am    Time OUT: 10:30 am     Mode of Visit: Video  Location of patient: -HOME-  [place] [facility]  You have chosen to receive care through a telehealth visit.  The patient has signed the video visit consent form.  The visit included audio and video interaction. No technical issues occurred during this visit.    Referring Provider: Tonny Oliveros DO    Chief Complaint:      ICD-10-CM ICD-9-CM   1. Situational mixed anxiety and depressive disorder [F43.23]  F43.23 309.28      History of Present Illness:   Dg Morse is a 40 y.o. male who is being seen today for follow up individual psychotherapy counseling. Dg discussed recent stressors including family, job; symptoms include feeling anxious, worrying; denied any SI/HI. CBT was used to assist Dg with processing thoughts/feelings and with building effective coping techniques (setting healthy boundaries when needed, using open communication); discussed safety planning, including 988 and going to the emergency room, if ever needed in the future.     Objective   Medications:     Current Outpatient Medications:     amLODIPine (NORVASC) 5 MG tablet, TAKE 1 TABLET BY MOUTH DAILY, Disp: 90 tablet, Rfl: 3    atorvastatin (LIPITOR) 10 MG tablet, Take 1 tablet by mouth Daily., Disp: 90 tablet, Rfl: 3    FLUoxetine (PROzac) 20 MG capsule, Take 1 capsule by mouth Daily for 180 days., Disp: 90 capsule, Rfl: 1    hydroCHLOROthiazide 25 MG tablet, Take 1 tablet by mouth Daily., Disp: 90 tablet, Rfl: 3    hydrOXYzine (ATARAX) 25 MG tablet, Take 1 tablet by mouth 3 (Three) Times a Day As Needed for Anxiety., Disp: 90 tablet, Rfl: 1    melatonin 5 MG tablet tablet, Take 1 tablet by mouth At Night As Needed., Disp: , Rfl:     multivitamin with minerals tablet tablet, Take 1 tablet by mouth Daily., Disp: , Rfl:     traZODone (DESYREL) 50  MG tablet, Take 1 tablet by mouth Every Night., Disp: 90 tablet, Rfl: 1    Allergies:   No Known Allergies    Mental Status Exam:   MENTAL STATUS EXAM   General Appearance:  Cleanly groomed and dressed  Eye Contact:  Good eye contact  Attitude:  Cooperative  Motor Activity:  Normal gait, posture  Muscle Strength:  Normal  Speech:  Normal rate, tone, volume  Language:  Spontaneous  Mood and affect:  Appropriate, mood congruent and anxious  Hopelessness:  Denies  Loneliness: Denies  Thought Process:  Logical  Associations/ Thought Content:  No delusions  Hallucinations:  None  Suicidal Ideations:  Not present  Homicidal Ideation:  Not present  Sensorium:  Alert  Orientation:  Person, place, time and situation  Immediate Recall, Recent, and Remote Memory:  Intact  Attention Span/ Concentration:  Good  Fund of Knowledge:  Appropriate for age and educational level  Intellectual Functioning:  Average range  Insight:  Good  Judgement:  Good  Reliability:  Good  Impulse Control:  Good     Subjective    PHQ-9 Depression Screening  Little interest or pleasure in doing things? (Patient-Rptd) Not at all   Feeling down, depressed, or hopeless? (Patient-Rptd) Not at all   PHQ-2 Total Score (Patient-Rptd) 0   Trouble falling or staying asleep, or sleeping too much? (Patient-Rptd) Not at all   Feeling tired or having little energy? (Patient-Rptd) Not at all   Poor appetite or overeating? (Patient-Rptd) Not at all   Feeling bad about yourself - or that you are a failure or have let yourself or your family down? (Patient-Rptd) Not at all   Trouble concentrating on things, such as reading the newspaper or watching television? (Patient-Rptd) Not at all   Moving or speaking so slowly that other people could have noticed? Or the opposite - being so fidgety or restless that you have been moving around a lot more than usual? (Patient-Rptd) Not at all   Thoughts that you would be better off dead, or of hurting yourself in some way?  (Patient-Rptd) Not at all   PHQ-9 Total Score (Patient-Rptd) 0   If you checked off any problems, how difficult have these problems made it for you to do your work, take care of things at home, or get along with other people? (Patient-Rptd) Not difficult at all     EUGENE-7  Feeling nervous, anxious or on edge: (Patient-Rptd) Several days  Not being able to stop or control worrying: (Patient-Rptd) Not at all  Worrying too much about different things: (Patient-Rptd) Several days  Trouble Relaxing: (Patient-Rptd) Not at all  Being so restless that it is hard to sit still: (Patient-Rptd) Not at all  Feeling afraid as if something awful might happen: (Patient-Rptd) Not at all  Becoming easily annoyed or irritable: (Patient-Rptd) Not at all  EUGENE 7 Total Score: (Patient-Rptd) 2  If you checked any problems, how difficult have these problems made it for you to do your work, take care of things at home, or get along with other people: (Patient-Rptd) Not difficult at all    Functional Status: Moderate impairment   Progress toward goal: At goal  Prognosis: Good with Ongoing Treatment     Assessment / Plan / Progress   Visit Diagnosis/Orders Placed This Visit:    ICD-10-CM ICD-9-CM   1. Situational mixed anxiety and depressive disorder [F43.23]  F43.23 309.28      PLAN:  Safety: No acute safety concerns  Risk Assessment: Risk of self-harm acutely is low. Risk of self-harm chronically is also low, but could be further elevated in the event of treatment noncompliance and/or AODA.    Treatment Plan/Goals & Progress: Continue supportive psychotherapy efforts and medications as indicated. Treatment options discussed during today's visit. Client acknowledged and verbally consented to continue with current treatment plan and was educated on the importance of compliance with treatment and follow-up appointments. Client seems reasonably able to adhere to treatment plan.      Assisted client in processing above session content;  acknowledged and normalized client’s thoughts, feelings, and concerns.     Allowed client to freely discuss issues without interruption or judgement with unconditional positive regard, active listening skills, and empathy. Clinician provided a safe, confidential environment to facilitate the development of a positive therapeutic relationship and encouraged open, honest communication. Assisted client in identifying risk factors which would indicate the need for higher level of care including thoughts to harm self or others and/or self-harming behavior and encouraged client to contact this office, call 911, or present to the nearest emergency room should any of these events occur. Discussed crisis intervention services and means to access. Client adamantly and convincingly denies current suicidal or homicidal ideation or perceptual disturbance. Assisted client in processing session content; acknowledged and normalized client’s thoughts, feelings, and concerns by utilizing a person-centered approach in efforts to build appropriate rapport and a positive therapeutic relationship with open and honest communication.      Follow Up:   Return in about 2 weeks (around 5/23/2025) for Video visit, therapy session.    Kasey Benitez LCSW  [facility]

## 2025-05-23 ENCOUNTER — TELEMEDICINE (OUTPATIENT)
Dept: PSYCHIATRY | Facility: CLINIC | Age: 40
End: 2025-05-23
Payer: COMMERCIAL

## 2025-05-23 DIAGNOSIS — F43.23 SITUATIONAL MIXED ANXIETY AND DEPRESSIVE DISORDER: Primary | ICD-10-CM

## 2025-05-23 NOTE — PROGRESS NOTES
Follow Up Adult Note   Date:2025   Client Name: Dg Morse  : 1985   MRN: [id number]   Time IN: 11:02 am    Time OUT: 11:41 am     Mode of Visit: Video  Location of patient: -HOME-  Location of provider: +[facility]+  You have chosen to receive care through a telehealth visit.  The patient has signed the video visit consent form.  The visit included audio and video interaction. No technical issues occurred during this visit.    Referring Provider: Tonny Oliveros DO    Chief Complaint:      ICD-10-CM ICD-9-CM   1. Situational mixed anxiety and depressive disorder [F43.23]  F43.23 309.28      History of Present Illness:   Dg Morse is a 40 y.o. male who is being seen today for follow up individual psychotherapy counseling. Dg reported significant improvement with primary relationship; discussed one recent stressor re: employment; symptoms include some anxiety; identified supportive relationships and community; is consistently using positive coping techniques. CBT was used to assist Dg with processing thoughts/feelings and with reviewing/reinforcing effective coping techniques.      Objective   Mental Status Exam:   MENTAL STATUS EXAM   General Appearance:  Cleanly groomed and dressed  Eye Contact:  Good eye contact  Attitude:  Cooperative  Motor Activity:  Normal gait, posture  Muscle Strength:  Normal  Speech:  Normal rate, tone, volume  Language:  Spontaneous  Mood and affect:  Appropriate, mood congruent and normal, pleasant  Hopelessness:  Denies  Loneliness: Denies  Thought Process:  Logical  Associations/ Thought Content:  No delusions  Hallucinations:  None  Suicidal Ideations:  Not present  Homicidal Ideation:  Not present  Sensorium:  Alert  Orientation:  Person, place, time and situation  Immediate Recall, Recent, and Remote Memory:  Intact  Attention Span/ Concentration:  Good  Fund of Knowledge:  Appropriate for age and educational level  Intellectual Functioning:  Average  range  Insight:  Good  Judgement:  Good  Reliability:  Good  Impulse Control:  Good   Subjective    PHQ-9 Depression Screening  Little interest or pleasure in doing things? (Patient-Rptd) Not at all   Feeling down, depressed, or hopeless? (Patient-Rptd) Not at all   PHQ-2 Total Score (Patient-Rptd) 0   Trouble falling or staying asleep, or sleeping too much? (Patient-Rptd) Not at all   Feeling tired or having little energy? (Patient-Rptd) Not at all   Poor appetite or overeating? (Patient-Rptd) Not at all   Feeling bad about yourself - or that you are a failure or have let yourself or your family down? (Patient-Rptd) Several days   Trouble concentrating on things, such as reading the newspaper or watching television? (Patient-Rptd) Not at all   Moving or speaking so slowly that other people could have noticed? Or the opposite - being so fidgety or restless that you have been moving around a lot more than usual? (Patient-Rptd) Not at all   Thoughts that you would be better off dead, or of hurting yourself in some way? (Patient-Rptd) Not at all   PHQ-9 Total Score (Patient-Rptd) 1   If you checked off any problems, how difficult have these problems made it for you to do your work, take care of things at home, or get along with other people? (Patient-Rptd) Not difficult at all     EUGENE-7  Feeling nervous, anxious or on edge: (Patient-Rptd) Not at all  Not being able to stop or control worrying: (Patient-Rptd) Not at all  Worrying too much about different things: (Patient-Rptd) Several days  Trouble Relaxing: (Patient-Rptd) Not at all  Being so restless that it is hard to sit still: (Patient-Rptd) Not at all  Feeling afraid as if something awful might happen: (Patient-Rptd) Not at all  Becoming easily annoyed or irritable: (Patient-Rptd) Not at all  EUGENE 7 Total Score: (Patient-Rptd) 1  If you checked any problems, how difficult have these problems made it for you to do your work, take care of things at home, or get along  with other people: (Patient-Rptd) Not difficult at all    Functional Status: Mild impairment   Progress toward goal: At goal  Prognosis: Good with Ongoing Treatment     Assessment / Plan / Progress   Visit Diagnosis/Orders Placed This Visit:    ICD-10-CM ICD-9-CM   1. Situational mixed anxiety and depressive disorder [F43.23]  F43.23 309.28      PLAN:  Safety: No acute safety concerns  Risk Assessment: Risk of self-harm acutely is low. Risk of self-harm chronically is also low, but could be further elevated in the event of treatment noncompliance and/or AODA.    Treatment Plan/Goals & Progress: Continue supportive psychotherapy efforts and medications as indicated. Treatment options discussed during today's visit. Client acknowledged and verbally consented to continue with current treatment plan and was educated on the importance of compliance with treatment and follow-up appointments. Client seems reasonably able to adhere to treatment plan.      Assisted client in processing above session content; acknowledged and normalized client’s thoughts, feelings, and concerns.     Allowed client to freely discuss issues without interruption or judgement with unconditional positive regard, active listening skills, and empathy. Clinician provided a safe, confidential environment to facilitate the development of a positive therapeutic relationship and encouraged open, honest communication. Assisted client in identifying risk factors which would indicate the need for higher level of care including thoughts to harm self or others and/or self-harming behavior and encouraged client to contact this office, call 911, or present to the nearest emergency room should any of these events occur. Discussed crisis intervention services and means to access. Client adamantly and convincingly denies current suicidal or homicidal ideation or perceptual disturbance. Assisted client in processing session content; acknowledged and normalized  client’s thoughts, feelings, and concerns by utilizing a person-centered approach in efforts to build appropriate rapport and a positive therapeutic relationship with open and honest communication.      Follow Up:   Return in 3 weeks (on 6/13/2025) for therapy session, Video visit.    Kasey Benitez LCSW  [facility]

## 2025-06-05 ENCOUNTER — TELEMEDICINE (OUTPATIENT)
Dept: PSYCHIATRY | Facility: CLINIC | Age: 40
End: 2025-06-05
Payer: COMMERCIAL

## 2025-06-05 ENCOUNTER — TELEPHONE (OUTPATIENT)
Dept: PSYCHIATRY | Facility: CLINIC | Age: 40
End: 2025-06-05
Payer: COMMERCIAL

## 2025-06-05 DIAGNOSIS — F43.23 SITUATIONAL MIXED ANXIETY AND DEPRESSIVE DISORDER: Primary | ICD-10-CM

## 2025-06-05 DIAGNOSIS — G47.00 INSOMNIA, UNSPECIFIED TYPE: ICD-10-CM

## 2025-06-05 NOTE — TELEPHONE ENCOUNTER
I have tried calling Dg twice to reschedule his appt with Kasey. He has been unreachable. I have also sent a [employer] message. He is moving sometime next month, so if he is not able to get in with Kasey before then, he will be transferring care per [facility].

## 2025-06-05 NOTE — PROGRESS NOTES
Video Visit      Patient Name: Dg Morse  : 1985   MRN: [id number]     Referring Provider: Tonny Oliveros DO    Chief Complaint:      ICD-10-CM ICD-9-CM   1. Situational mixed anxiety and depressive disorder [F43.23]  F43.23 309.28   2. Insomnia, unspecified type  G47.00 780.52        This provider is located at the [facility] (through [facility]), [address], [address], [facility] [address] using a secure [employer] Video Visit through [employer]. Patient is being seen remotely via telehealth video visit at their home address in [place], and stated they are in a secure environment for this session. The patient's condition being diagnosed/treated is appropriate for telemedicine. The provider identified herself as well as her credentials. The patient, and/or patients guardian, consent to be seen remotely, and when consent is given they understand that the consent allows for patient identifiable information to be sent to a third party as needed. They may refuse to be seen remotely at any time. The electronic data is encrypted and password protected, and the patient and/or guardian has been advised of the potential risks to privacy not withstanding such measures.    The patient has chosen to receive care today through a telehealth video visit. Do you consent to use a video/audio connection for your medical care today? Yes    History of Present Illness  Dg Morse is a 40 y.o. male who is being seen by a video visit today for medication and symptom management.    He reports significant progress in his condition, attributing this to recent life changes that have positively impacted his family's well-being. He is scheduled to relocate to [place] between the end of 2025 and early 2025, which he anticipates will further enhance his mental health by distancing him from a stressful environment and situations. He has been gradually reducing his  Prozac intake, with the last dose taken a week ago.  He wanted to see how he would do without his medication due to trying to change situations in his life and use coping skills.  Initially, he experienced some discomfort during the first two days of medication reduction but has since adjusted well. His focus has been primarily on his son's graduation and impending move, which he believes has distracted him from his anxiety triggers. He has not been using hydroxyzine or trazodone as he has been managing to maintain a regular sleep schedule. However, he expresses concern about potential sleep disturbances due to the longer daylight hours of summer and plans to use blackout curtains to mitigate this. He also acknowledges that his current stress levels may be contributing to his sleep issues. He feels that his medication was previously necessary due to a specific individual's actions, but he now believes he can manage without it. He is committed to moving forward and not dwelling on past events. He had a counseling session approximately two weeks ago and informed his counselor about his upcoming move.    After we discussed medication and symptom management, patient's medications will be discontinued due to patient no longer taking.  Patient will be moving to [place] and is aware that if situations arise and he feels that he needs his medication again that he will need to find a provider in [place] to assist with his needs after he moves.  Instructed patient if his before his move and he needs to follow up with this provider I be more than happy to help him.  He does have 1 more therapy appointment prior to his move and he is highly encouraged to attend.  He was instructed with any new or worsening symptoms to notify this provider.  He was instructed with any thoughts of self-harm or suicidal ideation with intent or plan to go directly to the emergency room.  He was instructed on adequate nutrition and  hydration as well as adapting to a good exercise regimen to help improve his overall mental and physical wellbeing.  He verbalized understanding to all.  He denies any recent or current SI/HI/AVH.  He will follow up with this provider as needed.    Social History:  - Relocating to [place] between the end of 07/2025 and early 08/2025  - Son recently graduated and plans to attend college online  - Family support from relatives living in various locations        Subjective      Review of Systems:   Review of Systems   Psychiatric/Behavioral:  Positive for sleep disturbance, depressed mood and stress.        Screening Scores:   PHQ-9 Total Score: (Patient-Rptd) 0  EUGENE-7  Feeling nervous, anxious or on edge: (Patient-Rptd) Several days  Not being able to stop or control worrying: (Patient-Rptd) Not at all  Worrying too much about different things: (Patient-Rptd) Not at all  Trouble Relaxing: (Patient-Rptd) Not at all  Being so restless that it is hard to sit still: (Patient-Rptd) Not at all  Feeling afraid as if something awful might happen: (Patient-Rptd) Not at all  Becoming easily annoyed or irritable: (Patient-Rptd) Not at all  EUGENE 7 Total Score: (Patient-Rptd) 1  If you checked any problems, how difficult have these problems made it for you to do your work, take care of things at home, or get along with other people: (Patient-Rptd) Not difficult at all      RISK ASSESSMENT:  Patient denies any thoughts of suicide or intent today. Patient denies any suicidal or homicidal ideation today. Patient denies any high risk factors today.     Medications:     Current Outpatient Medications:     amLODIPine (NORVASC) 5 MG tablet, TAKE 1 TABLET BY MOUTH DAILY, Disp: 90 tablet, Rfl: 3    atorvastatin (LIPITOR) 10 MG tablet, Take 1 tablet by mouth Daily., Disp: 90 tablet, Rfl: 3    hydroCHLOROthiazide 25 MG tablet, Take 1 tablet by mouth Daily., Disp: 90 tablet, Rfl: 3    melatonin 5 MG tablet tablet, Take 1 tablet by mouth  At Night As Needed., Disp: , Rfl:     multivitamin with minerals tablet tablet, Take 1 tablet by mouth Daily., Disp: , Rfl:     Medication Considerations:  KELLY reviewed and appropriate.      Allergies:   No Known Allergies    Objective     Physical Exam:  Vital Signs: There were no vitals filed for this visit.  There is no height or weight on file to calculate BMI.     Mental Status Exam:   MENTAL STATUS EXAM   General Appearance:  Cleanly groomed and dressed  Eye Contact:  Good eye contact  Attitude:  Cooperative  Speech:  Normal rate, tone, volume  Language:  Spontaneous  Mood and affect:  Normal, pleasant  Hopelessness:  Denies  Loneliness: Denies  Thought Process:  Logical  Associations/ Thought Content:  No delusions  Hallucinations:  None  Suicidal Ideations:  Not present  Homicidal Ideation:  Not present  Sensorium:  Alert  Orientation:  Person, place, situation and time  Immediate Recall, Recent, and Remote Memory:  Intact  Attention Span/ Concentration:  Good  Fund of Knowledge:  Appropriate for age and educational level  Intellectual Functioning:  Average range  Insight:  Good  Judgement:  Fair  Reliability:  Fair  Impulse Control:  Fair         Assessment / Plan      Visit Diagnosis/Orders Placed This Visit:  Diagnoses and all orders for this visit:    1. Situational mixed anxiety and depressive disorder [F43.23] (Primary)    2. Insomnia, unspecified type         Functional Status: Mild impairment     Prognosis: Good with Ongoing Treatment     Impression/Formulation:  Patient appeared alert and oriented.  Patient is voluntarily requesting to begin outpatient therapy at [facility].  Patient is receptive to assistance with maintaining a stable lifestyle.  Patient presents with history of     ICD-10-CM ICD-9-CM   1. Situational mixed anxiety and depressive disorder [F43.23]  F43.23 309.28   2. Insomnia, unspecified type  G47.00 780.52   .     Treatment Plan:   -Discontinue Prozac,  trazodone, hydroxyzine due to patient no longer taking  -Follow up with this provider as needed  -Encouraged to continue psychotherapy even after his move to [place]      The KELLY report, reviewed through PDMP, of the past 12 months were reviewed and is appropriate.  The patient/guardian reports taking the medication only as prescribed.  The patient/guardian denies any abuse or misuse of the medication.  The patient/guardian denies any other substance use or issues.  There are no apparent substance related issues.  The patient reports no side effects of the current medication usage.  The patient/guardian has reported significant improvement with medication usage and wishes to continue medication as prescribed.  The patient/guardian is appropriate to continue with current medication usage at this time.  Reinforced risks and side effects of medication usage, patient and/or guardian verbalize understanding in their own words and are in agreement with current plan.    Discussed medication options and treatment plan of prescribed medication, any off label use of medication, as well as the risks, benefits, any black box warnings including increased suicidality, and side effects including but not limited to potential falls, dizziness, possible impaired driving, GI side effects (change in appetite, abdominal discomfort, nausea, vomiting, diarrhea, and/or constipation), dry mouth, somnolence, sedation, insomnia, activation, agitation, irritation, tremors, abnormal muscle movements or disorders, headache, sweating, possible bruising or rare bleeding, electrolyte and/or fluid abnormalities, change in blood pressure/heart rate/and or heart rhythm, sexual dysfunction, and metabolic adversities among others. Patient and/or guardian agreeable to call the office with any worsening of symptoms or onset of side effects, or if any concerns or questions arise.  The contact information for the office is made available to the  patient and/or guardian.  Patient and/or guardian agreeable to call 911 or go to the nearest ER should they begin having any SI/HI, or if any urgent concerns arise. No medication side effects or related complaints today.    GOALS:  Short Term Goals: Patient will be compliant with medication, and patient will have no significant medication related side effects.  Patient will be engaged in psychotherapy as indicated.  Patient will report subjective improvement of symptoms.  Long term goals: To stabilize mood and treat/improve subjective symptoms, the patient will stay out of the hospital, the patient will be at an optimal level of functioning, and the patient will take all medications as prescribed.  The patient/guardian verbalized understanding and agreement with goals that were mutually set.     Patient will continue supportive psychotherapy efforts and medications as indicated. Clinic will obtain release of information for current treatment team for continuity of care as needed. Patient will contact this office, call 911 or present to the nearest emergency room should suicidal or homicidal ideations occur. Discussed medication options and treatment plan of prescribed medication(s) as well as the risks, benefits, and potential side effects. Patient ackowledged and verbally consented to continue with current treatment plan and was educated on the importance of compliance with treatment and follow-up appointments.     Follow Up:   Return if symptoms worsen or fail to improve.      KRIS Jacobsen  [facility]     This is electronically signed by KRIS Jacobsen  06/05/2025 16:31 EDT    Mode of Visit: Video   Location of patient: -HOME-   [facility] provider[place] [facility]   You have chosen to receive care through a telehealth visit.   The patient has signed the video visit consent form.   The visit included audio and video interaction. No technical issues occurred during  this visit.     Patient or patient representative verbalized consent for the use of Ambient Listening during the visit with  KRIS Farmer for chart documentation. 6/5/2025  16:31 EDT      Part of this note may be an electronic transcription/translation of spoken language to printed text using the Dragon Dictation System.

## 2025-06-24 DIAGNOSIS — I10 ESSENTIAL HYPERTENSION: ICD-10-CM

## 2025-06-24 RX ORDER — HYDROCHLOROTHIAZIDE 25 MG/1
25 TABLET ORAL DAILY
Qty: 90 TABLET | Refills: 3 | Status: SHIPPED | OUTPATIENT
Start: 2025-06-24